# Patient Record
Sex: FEMALE | Race: WHITE | NOT HISPANIC OR LATINO | Employment: OTHER | URBAN - METROPOLITAN AREA
[De-identification: names, ages, dates, MRNs, and addresses within clinical notes are randomized per-mention and may not be internally consistent; named-entity substitution may affect disease eponyms.]

---

## 2023-03-15 PROBLEM — S46.912A STRAIN OF LEFT SHOULDER: Status: ACTIVE | Noted: 2023-03-15

## 2023-03-15 PROBLEM — Z20.822 SUSPECTED COVID-19 VIRUS INFECTION: Status: ACTIVE | Noted: 2023-03-15

## 2023-03-15 PROBLEM — M54.32 SCIATICA OF LEFT SIDE: Status: ACTIVE | Noted: 2023-03-15

## 2023-03-15 PROBLEM — Z96.641 STATUS POST RIGHT HIP REPLACEMENT: Status: ACTIVE | Noted: 2023-03-15

## 2023-03-15 PROBLEM — Z79.2 NEED FOR PROPHYLACTIC ANTIBIOTIC: Status: ACTIVE | Noted: 2023-03-15

## 2023-03-15 PROBLEM — M25.562 LEFT KNEE PAIN: Status: ACTIVE | Noted: 2023-03-15

## 2023-03-15 PROBLEM — M16.11 PRIMARY OSTEOARTHRITIS OF RIGHT HIP: Status: ACTIVE | Noted: 2023-03-15

## 2023-03-15 PROBLEM — S60.219A WRIST CONTUSION: Status: ACTIVE | Noted: 2023-03-15

## 2023-03-15 PROBLEM — J30.9 ALLERGIC RHINITIS: Status: ACTIVE | Noted: 2023-03-15

## 2023-03-15 PROBLEM — S83.249A MEDIAL MENISCUS TEAR: Status: ACTIVE | Noted: 2023-03-15

## 2023-03-15 PROBLEM — H90.3 BILATERAL SENSORINEURAL HEARING LOSS: Status: ACTIVE | Noted: 2023-03-15

## 2023-03-15 PROBLEM — M17.12 PRIMARY OSTEOARTHRITIS OF LEFT KNEE: Status: ACTIVE | Noted: 2023-03-15

## 2023-03-15 PROBLEM — R19.7 DIARRHEA: Status: ACTIVE | Noted: 2023-03-15

## 2023-03-15 PROBLEM — A09 DIARRHEA OF INFECTIOUS ORIGIN: Status: ACTIVE | Noted: 2023-03-15

## 2023-03-15 PROBLEM — R43.2 DYSGEUSIA: Status: ACTIVE | Noted: 2023-03-15

## 2023-03-15 PROBLEM — R10.9 ABDOMINAL PAIN: Status: ACTIVE | Noted: 2023-03-15

## 2023-03-15 PROBLEM — M79.645 BILATERAL THUMB PAIN: Status: ACTIVE | Noted: 2023-03-15

## 2023-03-15 PROBLEM — R00.2 PALPITATIONS: Status: ACTIVE | Noted: 2023-03-15

## 2023-03-15 PROBLEM — N39.3 STRESS INCONTINENCE OF URINE: Status: ACTIVE | Noted: 2023-03-15

## 2023-03-15 PROBLEM — R05.9 COUGH: Status: ACTIVE | Noted: 2023-03-15

## 2023-03-15 PROBLEM — M18.9 OSTEOARTHRITIS OF CARPOMETACARPAL (CMC) JOINT OF THUMB: Status: ACTIVE | Noted: 2023-03-15

## 2023-03-15 PROBLEM — I25.10 ARTERIOSCLEROSIS OF CORONARY ARTERY: Status: ACTIVE | Noted: 2023-03-15

## 2023-03-15 PROBLEM — S86.919A STRAIN OF KNEE: Status: ACTIVE | Noted: 2023-03-15

## 2023-03-15 PROBLEM — S80.00XA CONTUSION, KNEE: Status: ACTIVE | Noted: 2023-03-15

## 2023-03-15 PROBLEM — E03.9 ACQUIRED HYPOTHYROIDISM: Status: ACTIVE | Noted: 2023-03-15

## 2023-03-15 PROBLEM — R43.0 ANOSMIA: Status: ACTIVE | Noted: 2023-03-15

## 2023-03-15 PROBLEM — K52.832 LYMPHOCYTIC COLITIS: Status: ACTIVE | Noted: 2023-03-15

## 2023-03-15 PROBLEM — M19.012 GLENOHUMERAL ARTHRITIS, LEFT: Status: ACTIVE | Noted: 2023-03-15

## 2023-03-15 PROBLEM — E55.9 VITAMIN D DEFICIENCY: Status: ACTIVE | Noted: 2023-03-15

## 2023-03-15 PROBLEM — M79.644 BILATERAL THUMB PAIN: Status: ACTIVE | Noted: 2023-03-15

## 2023-03-15 PROBLEM — H91.90 HEARING LOSS: Status: ACTIVE | Noted: 2023-03-15

## 2023-03-15 PROBLEM — R82.998 LEUKOCYTES IN URINE: Status: ACTIVE | Noted: 2023-03-15

## 2023-03-15 PROBLEM — H91.93 BILATERAL HEARING LOSS: Status: ACTIVE | Noted: 2023-03-15

## 2023-03-15 PROBLEM — I10 ESSENTIAL HYPERTENSION: Status: ACTIVE | Noted: 2023-03-15

## 2023-03-15 PROBLEM — G47.00 INSOMNIA: Status: ACTIVE | Noted: 2023-03-15

## 2023-03-15 PROBLEM — E78.5 HYPERLIPIDEMIA: Status: ACTIVE | Noted: 2023-03-15

## 2023-03-15 PROBLEM — M25.551 RIGHT HIP PAIN: Status: ACTIVE | Noted: 2023-03-15

## 2023-03-15 PROBLEM — K62.89 IDIOPATHIC PROCTITIS: Status: ACTIVE | Noted: 2023-03-15

## 2023-03-15 RX ORDER — FLUTICASONE PROPIONATE 50 MCG
2 SPRAY, SUSPENSION (ML) NASAL DAILY
COMMUNITY
Start: 2017-01-20

## 2023-03-15 RX ORDER — IBUPROFEN 100 MG/5ML
1 SUSPENSION, ORAL (FINAL DOSE FORM) ORAL DAILY
COMMUNITY

## 2023-03-15 RX ORDER — BUTYROSPERMUM PARKII(SHEA BUTTER), SIMMONDSIA CHINENSIS (JOJOBA) SEED OIL, ALOE BARBADENSIS LEAF EXTRACT .01; 1; 3.5 G/100G; G/100G; G/100G
1 LIQUID TOPICAL 2 TIMES DAILY
COMMUNITY
Start: 2021-11-22

## 2023-03-15 RX ORDER — CHOLECALCIFEROL (VITAMIN D3) 25 MCG
1 TABLET ORAL DAILY
COMMUNITY

## 2023-03-15 RX ORDER — LEVOTHYROXINE SODIUM 88 UG/1
1 TABLET ORAL DAILY
COMMUNITY
Start: 2013-07-26 | End: 2023-09-06

## 2023-03-15 RX ORDER — CEFUROXIME AXETIL 250 MG/1
250 TABLET ORAL DAILY
COMMUNITY
End: 2023-06-19 | Stop reason: ALTCHOICE

## 2023-03-15 RX ORDER — ATORVASTATIN CALCIUM 40 MG/1
1 TABLET, FILM COATED ORAL DAILY
COMMUNITY
Start: 2014-02-10 | End: 2023-06-19 | Stop reason: SDUPTHER

## 2023-03-15 RX ORDER — EPINEPHRINE 0.3 MG/.3ML
INJECTION SUBCUTANEOUS
COMMUNITY
Start: 2019-08-17 | End: 2023-06-19 | Stop reason: SDUPTHER

## 2023-03-15 RX ORDER — METOPROLOL SUCCINATE 25 MG/1
1 TABLET, EXTENDED RELEASE ORAL DAILY
COMMUNITY
End: 2023-06-19 | Stop reason: ALTCHOICE

## 2023-03-15 RX ORDER — MIRABEGRON 25 MG/1
1 TABLET, FILM COATED, EXTENDED RELEASE ORAL DAILY
COMMUNITY
Start: 2022-05-04 | End: 2023-06-19

## 2023-03-31 ENCOUNTER — OFFICE VISIT (OUTPATIENT)
Dept: PRIMARY CARE | Facility: CLINIC | Age: 73
End: 2023-03-31
Payer: MEDICARE

## 2023-03-31 VITALS
HEIGHT: 66 IN | DIASTOLIC BLOOD PRESSURE: 66 MMHG | RESPIRATION RATE: 16 BRPM | BODY MASS INDEX: 26.71 KG/M2 | HEART RATE: 67 BPM | TEMPERATURE: 98.4 F | OXYGEN SATURATION: 98 % | SYSTOLIC BLOOD PRESSURE: 128 MMHG | WEIGHT: 166.2 LBS

## 2023-03-31 DIAGNOSIS — M25.572 ACUTE LEFT ANKLE PAIN: ICD-10-CM

## 2023-03-31 DIAGNOSIS — M79.672 FOOT PAIN, LEFT: Primary | ICD-10-CM

## 2023-03-31 PROCEDURE — 1036F TOBACCO NON-USER: CPT | Performed by: FAMILY MEDICINE

## 2023-03-31 PROCEDURE — 1159F MED LIST DOCD IN RCRD: CPT | Performed by: FAMILY MEDICINE

## 2023-03-31 PROCEDURE — 3078F DIAST BP <80 MM HG: CPT | Performed by: FAMILY MEDICINE

## 2023-03-31 PROCEDURE — 1160F RVW MEDS BY RX/DR IN RCRD: CPT | Performed by: FAMILY MEDICINE

## 2023-03-31 PROCEDURE — 3074F SYST BP LT 130 MM HG: CPT | Performed by: FAMILY MEDICINE

## 2023-03-31 PROCEDURE — 99213 OFFICE O/P EST LOW 20 MIN: CPT | Performed by: FAMILY MEDICINE

## 2023-03-31 ASSESSMENT — PATIENT HEALTH QUESTIONNAIRE - PHQ9
2. FEELING DOWN, DEPRESSED OR HOPELESS: NOT AT ALL
SUM OF ALL RESPONSES TO PHQ9 QUESTIONS 1 AND 2: 0
1. LITTLE INTEREST OR PLEASURE IN DOING THINGS: NOT AT ALL

## 2023-03-31 ASSESSMENT — PAIN SCALES - GENERAL: PAINLEVEL: 9

## 2023-03-31 NOTE — PROGRESS NOTES
"Subjective   Patient ID: Velma Lira is a 72 y.o. female who presents for Ankle Pain (LT x ).    Here with complaints of left ankle pain for the past few months.    Swelling and pain   Not red or warm  No injury noted  No sleeve  Taking 2 Tylenol arthritis in am  CBD pills in am    Worse first in am  Once up and moving slight better  Throbbing after walking  No hx of gout      Ankle Pain          Review of Systems    Objective   /66   Pulse 67   Temp 36.9 °C (98.4 °F)   Resp 16   Ht 1.676 m (5' 6\")   Wt 75.4 kg (166 lb 3.2 oz)   SpO2 98%   BMI 26.83 kg/m²     Physical Exam  Musculoskeletal:      Left ankle: Swelling present. Decreased range of motion.      Left foot: Swelling present.      Comments: Palp pain medial ankle joint and medial arch, not red warm or swollen in arch         Assessment/Plan   Problem List Items Addressed This Visit          Musculoskeletal    Foot pain, left - Primary    Relevant Orders    XR foot left 3+ views    Acute left ankle pain    Relevant Orders    XR ankle left 3+ views     Acute left foot and ankle swelling and pain  Checking x-ray  Continue Tylenol  Gentle ROM exercises  Consider checking for gout if not improving.   Return if not rapidly improving.      "

## 2023-04-03 DIAGNOSIS — M25.572 ACUTE LEFT ANKLE PAIN: Primary | ICD-10-CM

## 2023-05-12 DIAGNOSIS — I10 ESSENTIAL HYPERTENSION: Primary | ICD-10-CM

## 2023-05-12 RX ORDER — METOPROLOL SUCCINATE 50 MG/1
TABLET, EXTENDED RELEASE ORAL
Qty: 90 TABLET | Refills: 0 | Status: SHIPPED | OUTPATIENT
Start: 2023-05-12 | End: 2023-08-03 | Stop reason: SDUPTHER

## 2023-05-12 NOTE — TELEPHONE ENCOUNTER
Patient called, needs refill of Metoprolol ER called into [facility] on [address].  Last OV 3/31/2023, next OV 6/13/2023.

## 2023-06-13 ENCOUNTER — APPOINTMENT (OUTPATIENT)
Dept: PRIMARY CARE | Facility: CLINIC | Age: 73
End: 2023-06-13
Payer: MEDICARE

## 2023-06-19 ENCOUNTER — OFFICE VISIT (OUTPATIENT)
Dept: PRIMARY CARE | Facility: CLINIC | Age: 73
End: 2023-06-19
Payer: MEDICARE

## 2023-06-19 VITALS
SYSTOLIC BLOOD PRESSURE: 128 MMHG | RESPIRATION RATE: 16 BRPM | OXYGEN SATURATION: 96 % | WEIGHT: 165.2 LBS | BODY MASS INDEX: 26.55 KG/M2 | TEMPERATURE: 98.6 F | DIASTOLIC BLOOD PRESSURE: 74 MMHG | HEIGHT: 66 IN | HEART RATE: 66 BPM

## 2023-06-19 DIAGNOSIS — I10 ESSENTIAL HYPERTENSION: ICD-10-CM

## 2023-06-19 DIAGNOSIS — Z91.030 BEE STING ALLERGY: ICD-10-CM

## 2023-06-19 DIAGNOSIS — Z00.00 MEDICARE ANNUAL WELLNESS VISIT, SUBSEQUENT: Primary | ICD-10-CM

## 2023-06-19 DIAGNOSIS — N39.3 STRESS INCONTINENCE OF URINE: ICD-10-CM

## 2023-06-19 DIAGNOSIS — I25.10 ARTERIOSCLEROSIS OF CORONARY ARTERY: ICD-10-CM

## 2023-06-19 DIAGNOSIS — E78.2 MIXED HYPERLIPIDEMIA: ICD-10-CM

## 2023-06-19 DIAGNOSIS — E03.9 ACQUIRED HYPOTHYROIDISM: ICD-10-CM

## 2023-06-19 PROBLEM — R10.9 ABDOMINAL PAIN: Status: RESOLVED | Noted: 2023-03-15 | Resolved: 2023-06-19

## 2023-06-19 PROBLEM — Z79.2 NEED FOR PROPHYLACTIC ANTIBIOTIC: Status: RESOLVED | Noted: 2023-03-15 | Resolved: 2023-06-19

## 2023-06-19 PROBLEM — Z20.822 SUSPECTED COVID-19 VIRUS INFECTION: Status: RESOLVED | Noted: 2023-03-15 | Resolved: 2023-06-19

## 2023-06-19 PROBLEM — Z96.641 STATUS POST RIGHT HIP REPLACEMENT: Status: RESOLVED | Noted: 2023-03-15 | Resolved: 2023-06-19

## 2023-06-19 PROBLEM — M17.12 PRIMARY OSTEOARTHRITIS OF LEFT KNEE: Status: RESOLVED | Noted: 2023-03-15 | Resolved: 2023-06-19

## 2023-06-19 PROBLEM — S83.249A MEDIAL MENISCUS TEAR: Status: RESOLVED | Noted: 2023-03-15 | Resolved: 2023-06-19

## 2023-06-19 PROBLEM — M79.644 BILATERAL THUMB PAIN: Status: RESOLVED | Noted: 2023-03-15 | Resolved: 2023-06-19

## 2023-06-19 PROBLEM — M54.32 SCIATICA OF LEFT SIDE: Status: RESOLVED | Noted: 2023-03-15 | Resolved: 2023-06-19

## 2023-06-19 PROBLEM — R00.2 PALPITATIONS: Status: RESOLVED | Noted: 2023-03-15 | Resolved: 2023-06-19

## 2023-06-19 PROBLEM — M18.9 OSTEOARTHRITIS OF CARPOMETACARPAL (CMC) JOINT OF THUMB: Status: RESOLVED | Noted: 2023-03-15 | Resolved: 2023-06-19

## 2023-06-19 PROBLEM — M79.672 FOOT PAIN, LEFT: Status: RESOLVED | Noted: 2023-03-31 | Resolved: 2023-06-19

## 2023-06-19 PROBLEM — R19.7 DIARRHEA: Status: RESOLVED | Noted: 2023-03-15 | Resolved: 2023-06-19

## 2023-06-19 PROBLEM — H91.93 BILATERAL HEARING LOSS: Status: RESOLVED | Noted: 2023-03-15 | Resolved: 2023-06-19

## 2023-06-19 PROBLEM — M25.551 RIGHT HIP PAIN: Status: RESOLVED | Noted: 2023-03-15 | Resolved: 2023-06-19

## 2023-06-19 PROBLEM — M25.562 LEFT KNEE PAIN: Status: RESOLVED | Noted: 2023-03-15 | Resolved: 2023-06-19

## 2023-06-19 PROBLEM — J30.9 ALLERGIC RHINITIS: Status: RESOLVED | Noted: 2023-03-15 | Resolved: 2023-06-19

## 2023-06-19 PROBLEM — S46.912A STRAIN OF LEFT SHOULDER: Status: RESOLVED | Noted: 2023-03-15 | Resolved: 2023-06-19

## 2023-06-19 PROBLEM — R43.2 DYSGEUSIA: Status: RESOLVED | Noted: 2023-03-15 | Resolved: 2023-06-19

## 2023-06-19 PROBLEM — E55.9 VITAMIN D DEFICIENCY: Status: RESOLVED | Noted: 2023-03-15 | Resolved: 2023-06-19

## 2023-06-19 PROBLEM — A09 DIARRHEA OF INFECTIOUS ORIGIN: Status: RESOLVED | Noted: 2023-03-15 | Resolved: 2023-06-19

## 2023-06-19 PROBLEM — S60.219A WRIST CONTUSION: Status: RESOLVED | Noted: 2023-03-15 | Resolved: 2023-06-19

## 2023-06-19 PROBLEM — M16.11 PRIMARY OSTEOARTHRITIS OF RIGHT HIP: Status: RESOLVED | Noted: 2023-03-15 | Resolved: 2023-06-19

## 2023-06-19 PROBLEM — R43.0 ANOSMIA: Status: RESOLVED | Noted: 2023-03-15 | Resolved: 2023-06-19

## 2023-06-19 PROBLEM — S86.919A STRAIN OF KNEE: Status: RESOLVED | Noted: 2023-03-15 | Resolved: 2023-06-19

## 2023-06-19 PROBLEM — M25.572 ACUTE LEFT ANKLE PAIN: Status: RESOLVED | Noted: 2023-03-31 | Resolved: 2023-06-19

## 2023-06-19 PROBLEM — M79.645 BILATERAL THUMB PAIN: Status: RESOLVED | Noted: 2023-03-15 | Resolved: 2023-06-19

## 2023-06-19 PROBLEM — R82.998 LEUKOCYTES IN URINE: Status: RESOLVED | Noted: 2023-03-15 | Resolved: 2023-06-19

## 2023-06-19 PROBLEM — R05.9 COUGH: Status: RESOLVED | Noted: 2023-03-15 | Resolved: 2023-06-19

## 2023-06-19 PROBLEM — S80.00XA CONTUSION, KNEE: Status: RESOLVED | Noted: 2023-03-15 | Resolved: 2023-06-19

## 2023-06-19 PROBLEM — K62.89 IDIOPATHIC PROCTITIS: Status: RESOLVED | Noted: 2023-03-15 | Resolved: 2023-06-19

## 2023-06-19 PROCEDURE — 1036F TOBACCO NON-USER: CPT | Performed by: FAMILY MEDICINE

## 2023-06-19 PROCEDURE — 1160F RVW MEDS BY RX/DR IN RCRD: CPT | Performed by: FAMILY MEDICINE

## 2023-06-19 PROCEDURE — 99214 OFFICE O/P EST MOD 30 MIN: CPT | Performed by: FAMILY MEDICINE

## 2023-06-19 PROCEDURE — 1170F FXNL STATUS ASSESSED: CPT | Performed by: FAMILY MEDICINE

## 2023-06-19 PROCEDURE — G0439 PPPS, SUBSEQ VISIT: HCPCS | Performed by: FAMILY MEDICINE

## 2023-06-19 PROCEDURE — 1159F MED LIST DOCD IN RCRD: CPT | Performed by: FAMILY MEDICINE

## 2023-06-19 PROCEDURE — 3074F SYST BP LT 130 MM HG: CPT | Performed by: FAMILY MEDICINE

## 2023-06-19 PROCEDURE — 3078F DIAST BP <80 MM HG: CPT | Performed by: FAMILY MEDICINE

## 2023-06-19 RX ORDER — ATORVASTATIN CALCIUM 40 MG/1
40 TABLET, FILM COATED ORAL DAILY
Qty: 90 TABLET | Refills: 1 | Status: SHIPPED | OUTPATIENT
Start: 2023-06-19 | End: 2023-07-17 | Stop reason: SDUPTHER

## 2023-06-19 RX ORDER — EPINEPHRINE 0.3 MG/.3ML
1 INJECTION SUBCUTANEOUS AS NEEDED
Qty: 2 ML | Refills: 3 | Status: SHIPPED | OUTPATIENT
Start: 2023-06-19

## 2023-06-19 ASSESSMENT — ACTIVITIES OF DAILY LIVING (ADL)
DOING_HOUSEWORK: INDEPENDENT
DRESSING: INDEPENDENT
TAKING_MEDICATION: INDEPENDENT
GROCERY_SHOPPING: INDEPENDENT
BATHING: INDEPENDENT
MANAGING_FINANCES: INDEPENDENT

## 2023-06-19 ASSESSMENT — PATIENT HEALTH QUESTIONNAIRE - PHQ9
SUM OF ALL RESPONSES TO PHQ9 QUESTIONS 1 AND 2: 0
2. FEELING DOWN, DEPRESSED OR HOPELESS: NOT AT ALL
1. LITTLE INTEREST OR PLEASURE IN DOING THINGS: NOT AT ALL

## 2023-06-19 NOTE — ASSESSMENT & PLAN NOTE
Stable  Refilling meds at same dose.  Checking basic labs.  Continue regular physical activity.  Recheck in 6 months

## 2023-06-19 NOTE — PROGRESS NOTES
"Subjective   Reason for Visit: Velma Lira is an 72 y.o. female here for a Medicare Wellness visit.     Past Medical, Surgical, and Family History reviewed and updated in chart.    Reviewed all medications by prescribing practitioner or clinical pharmacist (such as prescriptions, OTCs, herbal therapies and supplements) and documented in the medical record.    Here for general check and wellness exam.    Taking medications daily for hypertension hyperlipidemia hypothyroidism and allergies.  Stable on all meds.  No home BP checks  Playing golf regular.   No longer needing to see cardiology  Denies chest pain, shortness of breath, lightheaded, dizziness or headaches.   Sleeping ok  Diet is good  Taking tylenol arthritis and stable    Taking vitamin D and calcium daily.  Mammogram 1/30/2023  Bone density 2/20.  Colonoscopy 2/21 recheck in 10  Due for Prevnar 20        Patient Care Team:  Jessa Kaufman Pla, DO as PCP - General  Jessa Kaufman Pla, DO as PCP - [facility] Attributed Provider     Review of Systems    Objective   Vitals:  /74   Pulse 66   Temp 37 °C (98.6 °F)   Resp 16   Ht 1.676 m (5' 6\")   Wt 74.9 kg (165 lb 3.2 oz)   SpO2 96%   BMI 26.66 kg/m²       Physical Exam  Vitals and nursing note reviewed.   Constitutional:       Appearance: Normal appearance.   Cardiovascular:      Rate and Rhythm: Normal rate and regular rhythm.   Pulmonary:      Effort: Pulmonary effort is normal.      Breath sounds: Normal breath sounds.   Musculoskeletal:      Cervical back: Normal range of motion.   Neurological:      Mental Status: She is alert.   Psychiatric:         Mood and Affect: Mood normal.         Behavior: Behavior normal.         Thought Content: Thought content normal.         Judgment: Judgment normal.         Assessment/Plan   Problem List Items Addressed This Visit       Acquired hypothyroidism    Current Assessment & Plan     Stable  Reviewed previous TSH results.  Recheck in 6 months      "    Arteriosclerosis of coronary artery    Current Assessment & Plan     Stable  Continue diet changes.  Continue statin.  Recheck in 6 months         Relevant Medications    EPINEPHrine 0.3 mg/0.3 mL injection syringe    Essential hypertension    Current Assessment & Plan     Stable  Refilling meds at same dose.  Checking basic labs.  Continue regular physical activity.  Recheck in 6 months         Relevant Orders    CBC and Auto Differential    Hyperlipidemia    Current Assessment & Plan     Reviewed diet.  Checking fasting lipid profile and adjust meds accordingly         Relevant Medications    atorvastatin (Lipitor) 40 mg tablet    Other Relevant Orders    Comprehensive Metabolic Panel    Lipid Panel    Stress incontinence of urine    Current Assessment & Plan     Stable without meds  Using pads.  Continue to monitor         Medicare annual wellness visit, subsequent - Primary    Current Assessment & Plan     Exam complete.  Health screenings up-to-date.  Declined Prevnar 20 vaccine.  AD reviewed.              Other Visit Diagnoses       Bee sting allergy        Relevant Medications    EPINEPHrine 0.3 mg/0.3 mL injection syringe

## 2023-07-03 ENCOUNTER — TELEPHONE (OUTPATIENT)
Dept: PRIMARY CARE | Facility: CLINIC | Age: 73
End: 2023-07-03
Payer: MEDICARE

## 2023-07-12 ENCOUNTER — LAB (OUTPATIENT)
Dept: LAB | Facility: LAB | Age: 73
End: 2023-07-12
Payer: MEDICARE

## 2023-07-12 DIAGNOSIS — I10 ESSENTIAL HYPERTENSION: ICD-10-CM

## 2023-07-12 DIAGNOSIS — E78.2 MIXED HYPERLIPIDEMIA: ICD-10-CM

## 2023-07-12 LAB
ALANINE AMINOTRANSFERASE (SGPT) (U/L) IN SER/PLAS: 15 U/L (ref 7–45)
ALBUMIN (G/DL) IN SER/PLAS: 4.1 G/DL (ref 3.4–5)
ALKALINE PHOSPHATASE (U/L) IN SER/PLAS: 56 U/L (ref 33–136)
ANION GAP IN SER/PLAS: 11 MMOL/L (ref 10–20)
ASPARTATE AMINOTRANSFERASE (SGOT) (U/L) IN SER/PLAS: 19 U/L (ref 9–39)
BASOPHILS (10*3/UL) IN BLOOD BY AUTOMATED COUNT: 0.04 X10E9/L (ref 0–0.1)
BASOPHILS/100 LEUKOCYTES IN BLOOD BY AUTOMATED COUNT: 0.7 % (ref 0–2)
BILIRUBIN TOTAL (MG/DL) IN SER/PLAS: 0.8 MG/DL (ref 0–1.2)
CALCIUM (MG/DL) IN SER/PLAS: 9.6 MG/DL (ref 8.6–10.6)
CARBON DIOXIDE, TOTAL (MMOL/L) IN SER/PLAS: 30 MMOL/L (ref 21–32)
CHLORIDE (MMOL/L) IN SER/PLAS: 108 MMOL/L (ref 98–107)
CHOLESTEROL (MG/DL) IN SER/PLAS: 165 MG/DL (ref 0–199)
CHOLESTEROL IN HDL (MG/DL) IN SER/PLAS: 65.4 MG/DL
CHOLESTEROL/HDL RATIO: 2.5
CREATININE (MG/DL) IN SER/PLAS: 0.69 MG/DL (ref 0.5–1.05)
EOSINOPHILS (10*3/UL) IN BLOOD BY AUTOMATED COUNT: 0.29 X10E9/L (ref 0–0.4)
EOSINOPHILS/100 LEUKOCYTES IN BLOOD BY AUTOMATED COUNT: 4.8 % (ref 0–6)
ERYTHROCYTE DISTRIBUTION WIDTH (RATIO) BY AUTOMATED COUNT: 12.7 % (ref 11.5–14.5)
ERYTHROCYTE MEAN CORPUSCULAR HEMOGLOBIN CONCENTRATION (G/DL) BY AUTOMATED: 33 G/DL (ref 32–36)
ERYTHROCYTE MEAN CORPUSCULAR VOLUME (FL) BY AUTOMATED COUNT: 93 FL (ref 80–100)
ERYTHROCYTES (10*6/UL) IN BLOOD BY AUTOMATED COUNT: 4.14 X10E12/L (ref 4–5.2)
GFR FEMALE: >90 ML/MIN/1.73M2
GLUCOSE (MG/DL) IN SER/PLAS: 102 MG/DL (ref 74–99)
HEMATOCRIT (%) IN BLOOD BY AUTOMATED COUNT: 38.5 % (ref 36–46)
HEMOGLOBIN (G/DL) IN BLOOD: 12.7 G/DL (ref 12–16)
IMMATURE GRANULOCYTES/100 LEUKOCYTES IN BLOOD BY AUTOMATED COUNT: 0.2 % (ref 0–0.9)
LDL: 72 MG/DL (ref 0–99)
LEUKOCYTES (10*3/UL) IN BLOOD BY AUTOMATED COUNT: 6 X10E9/L (ref 4.4–11.3)
LYMPHOCYTES (10*3/UL) IN BLOOD BY AUTOMATED COUNT: 2.32 X10E9/L (ref 0.8–3)
LYMPHOCYTES/100 LEUKOCYTES IN BLOOD BY AUTOMATED COUNT: 38.7 % (ref 13–44)
MONOCYTES (10*3/UL) IN BLOOD BY AUTOMATED COUNT: 0.53 X10E9/L (ref 0.05–0.8)
MONOCYTES/100 LEUKOCYTES IN BLOOD BY AUTOMATED COUNT: 8.8 % (ref 2–10)
NEUTROPHILS (10*3/UL) IN BLOOD BY AUTOMATED COUNT: 2.81 X10E9/L (ref 1.6–5.5)
NEUTROPHILS/100 LEUKOCYTES IN BLOOD BY AUTOMATED COUNT: 46.8 % (ref 40–80)
NRBC (PER 100 WBCS) BY AUTOMATED COUNT: 0 /100 WBC (ref 0–0)
PLATELETS (10*3/UL) IN BLOOD AUTOMATED COUNT: 177 X10E9/L (ref 150–450)
POTASSIUM (MMOL/L) IN SER/PLAS: 4.3 MMOL/L (ref 3.5–5.3)
PROTEIN TOTAL: 6.2 G/DL (ref 6.4–8.2)
SODIUM (MMOL/L) IN SER/PLAS: 145 MMOL/L (ref 136–145)
TRIGLYCERIDE (MG/DL) IN SER/PLAS: 138 MG/DL (ref 0–149)
UREA NITROGEN (MG/DL) IN SER/PLAS: 17 MG/DL (ref 6–23)
VLDL: 28 MG/DL (ref 0–40)

## 2023-07-12 PROCEDURE — 85025 COMPLETE CBC W/AUTO DIFF WBC: CPT

## 2023-07-12 PROCEDURE — 80061 LIPID PANEL: CPT

## 2023-07-12 PROCEDURE — 80053 COMPREHEN METABOLIC PANEL: CPT

## 2023-07-12 PROCEDURE — 36415 COLL VENOUS BLD VENIPUNCTURE: CPT

## 2023-07-17 DIAGNOSIS — E78.2 MIXED HYPERLIPIDEMIA: ICD-10-CM

## 2023-07-17 RX ORDER — ATORVASTATIN CALCIUM 40 MG/1
40 TABLET, FILM COATED ORAL DAILY
Qty: 90 TABLET | Refills: 0 | Status: SHIPPED | OUTPATIENT
Start: 2023-07-17 | End: 2023-10-02 | Stop reason: SDUPTHER

## 2023-08-02 DIAGNOSIS — I10 ESSENTIAL HYPERTENSION: ICD-10-CM

## 2023-08-03 ENCOUNTER — TELEPHONE (OUTPATIENT)
Dept: PRIMARY CARE | Facility: CLINIC | Age: 73
End: 2023-08-03
Payer: MEDICARE

## 2023-08-03 DIAGNOSIS — I10 ESSENTIAL HYPERTENSION: ICD-10-CM

## 2023-08-03 RX ORDER — METOPROLOL SUCCINATE 50 MG/1
TABLET, EXTENDED RELEASE ORAL
Qty: 90 TABLET | Refills: 0 | OUTPATIENT
Start: 2023-08-03

## 2023-08-03 RX ORDER — METOPROLOL SUCCINATE 50 MG/1
50 TABLET, EXTENDED RELEASE ORAL DAILY
Qty: 90 TABLET | Refills: 0 | Status: SHIPPED | OUTPATIENT
Start: 2023-08-03 | End: 2023-10-31 | Stop reason: SDUPTHER

## 2023-09-06 DIAGNOSIS — E03.9 ACQUIRED HYPOTHYROIDISM: Primary | ICD-10-CM

## 2023-09-06 RX ORDER — LEVOTHYROXINE SODIUM 88 UG/1
88 TABLET ORAL DAILY
Qty: 90 TABLET | Refills: 0 | Status: SHIPPED | OUTPATIENT
Start: 2023-09-06 | End: 2024-01-01

## 2023-10-02 DIAGNOSIS — E78.2 MIXED HYPERLIPIDEMIA: ICD-10-CM

## 2023-10-02 RX ORDER — ATORVASTATIN CALCIUM 40 MG/1
40 TABLET, FILM COATED ORAL DAILY
Qty: 90 TABLET | Refills: 0 | Status: SHIPPED | OUTPATIENT
Start: 2023-10-02 | End: 2024-04-11

## 2023-10-31 DIAGNOSIS — I10 ESSENTIAL HYPERTENSION: ICD-10-CM

## 2023-10-31 RX ORDER — METOPROLOL SUCCINATE 50 MG/1
50 TABLET, EXTENDED RELEASE ORAL DAILY
Qty: 90 TABLET | Refills: 0 | Status: SHIPPED | OUTPATIENT
Start: 2023-10-31 | End: 2024-01-16 | Stop reason: SDUPTHER

## 2023-10-31 NOTE — TELEPHONE ENCOUNTER
Last OV 06/23   Requested Prescriptions     Pending Prescriptions Disp Refills    metoprolol succinate XL (Toprol-XL) 50 mg 24 hr tablet 90 tablet 0     Sig: Take 1 tablet (50 mg) by mouth once daily. Do not crush or chew.

## 2023-12-26 DIAGNOSIS — E03.9 ACQUIRED HYPOTHYROIDISM: ICD-10-CM

## 2023-12-27 NOTE — TELEPHONE ENCOUNTER
Pt called for refills of levothyroxine 88 mcg sent to [facility] on [address]  Next OV 1/6/2024.

## 2024-01-01 DIAGNOSIS — I10 ESSENTIAL HYPERTENSION: ICD-10-CM

## 2024-01-01 DIAGNOSIS — E03.9 ACQUIRED HYPOTHYROIDISM: Primary | ICD-10-CM

## 2024-01-01 RX ORDER — LEVOTHYROXINE SODIUM 88 UG/1
88 TABLET ORAL DAILY
Qty: 90 TABLET | Refills: 0 | Status: SHIPPED | OUTPATIENT
Start: 2024-01-01 | End: 2024-04-14

## 2024-01-16 ENCOUNTER — OFFICE VISIT (OUTPATIENT)
Dept: PRIMARY CARE | Facility: CLINIC | Age: 74
End: 2024-01-16
Payer: MEDICARE

## 2024-01-16 VITALS
WEIGHT: 169.6 LBS | OXYGEN SATURATION: 97 % | HEIGHT: 66 IN | RESPIRATION RATE: 16 BRPM | TEMPERATURE: 97 F | BODY MASS INDEX: 27.26 KG/M2 | SYSTOLIC BLOOD PRESSURE: 130 MMHG | HEART RATE: 72 BPM | DIASTOLIC BLOOD PRESSURE: 74 MMHG

## 2024-01-16 DIAGNOSIS — I10 ESSENTIAL HYPERTENSION: ICD-10-CM

## 2024-01-16 DIAGNOSIS — E78.2 MIXED HYPERLIPIDEMIA: ICD-10-CM

## 2024-01-16 DIAGNOSIS — E03.9 ACQUIRED HYPOTHYROIDISM: Primary | ICD-10-CM

## 2024-01-16 PROCEDURE — 1159F MED LIST DOCD IN RCRD: CPT | Performed by: FAMILY MEDICINE

## 2024-01-16 PROCEDURE — 99214 OFFICE O/P EST MOD 30 MIN: CPT | Performed by: FAMILY MEDICINE

## 2024-01-16 PROCEDURE — 1036F TOBACCO NON-USER: CPT | Performed by: FAMILY MEDICINE

## 2024-01-16 PROCEDURE — 1125F AMNT PAIN NOTED PAIN PRSNT: CPT | Performed by: FAMILY MEDICINE

## 2024-01-16 PROCEDURE — 3075F SYST BP GE 130 - 139MM HG: CPT | Performed by: FAMILY MEDICINE

## 2024-01-16 PROCEDURE — 1160F RVW MEDS BY RX/DR IN RCRD: CPT | Performed by: FAMILY MEDICINE

## 2024-01-16 PROCEDURE — 3078F DIAST BP <80 MM HG: CPT | Performed by: FAMILY MEDICINE

## 2024-01-16 RX ORDER — METOPROLOL SUCCINATE 50 MG/1
50 TABLET, EXTENDED RELEASE ORAL DAILY
Qty: 90 TABLET | Refills: 0 | Status: SHIPPED | OUTPATIENT
Start: 2024-01-16 | End: 2024-05-01 | Stop reason: SDUPTHER

## 2024-01-16 NOTE — PROGRESS NOTES
"Subjective   Patient ID: Velma Lira is a 73 y.o. female who presents for Follow-up (6 mth med check).    Here for med check and refill.    Taking medications daily for hypertension hyperlipidemia.  In general doing well.    No no home BP checks  Denies chest pain, shortness of breath, lightheaded, dizziness or headaches.   Caring for her  can be a challenge   He does not want to move much.    Exercise bike and elliptical daily for 60 min   Watching her diet.  Weight is stable.   Sleeping poorly off and on   Some difficulty falling asleep  Taking CBD/THC gummies off and on  Melatonin does not help.     Will be seeing gyn this year for mammogram         Review of Systems    Objective   /74   Pulse 72   Temp 36.1 °C (97 °F)   Resp 16   Ht 1.676 m (5' 6\")   Wt 76.9 kg (169 lb 9.6 oz)   SpO2 97%   BMI 27.37 kg/m²     Physical Exam  Vitals and nursing note reviewed.   Constitutional:       Appearance: Normal appearance.   Cardiovascular:      Rate and Rhythm: Normal rate and regular rhythm.   Pulmonary:      Effort: Pulmonary effort is normal.      Breath sounds: Normal breath sounds.   Musculoskeletal:      Cervical back: Normal range of motion.   Neurological:      Mental Status: She is alert.   Psychiatric:         Mood and Affect: Mood normal.         Behavior: Behavior normal.         Thought Content: Thought content normal.         Judgment: Judgment normal.       Assessment/Plan   Problem List Items Addressed This Visit             ICD-10-CM    Acquired hypothyroidism - Primary E03.9     Stable  Checking TSH level and adjust meds accordingly         Relevant Orders    TSH with reflex to Free T4 if abnormal    Essential hypertension I10     Stable  Refilling meds at same dose.  Continue regular physical activity.  Continue weight loss efforts.  Recheck in 6 months         Relevant Medications    metoprolol succinate XL (Toprol-XL) 50 mg 24 hr tablet    Other Relevant Orders    CBC and Auto " Differential    Hyperlipidemia E78.5     Checking fasting lipid profile and adjust meds accordingly         Relevant Orders    Comprehensive Metabolic Panel    Lipid Panel

## 2024-01-16 NOTE — ASSESSMENT & PLAN NOTE
Stable  Refilling meds at same dose.  Continue regular physical activity.  Continue weight loss efforts.  Recheck in 6 months

## 2024-04-11 ENCOUNTER — TELEPHONE (OUTPATIENT)
Dept: PRIMARY CARE | Facility: CLINIC | Age: 74
End: 2024-04-11

## 2024-04-11 DIAGNOSIS — E78.2 MIXED HYPERLIPIDEMIA: ICD-10-CM

## 2024-04-11 DIAGNOSIS — E03.9 ACQUIRED HYPOTHYROIDISM: ICD-10-CM

## 2024-04-11 RX ORDER — ATORVASTATIN CALCIUM 40 MG/1
40 TABLET, FILM COATED ORAL DAILY
Qty: 90 TABLET | Refills: 0 | Status: SHIPPED | OUTPATIENT
Start: 2024-04-11

## 2024-04-11 RX ORDER — LEVOTHYROXINE SODIUM 88 UG/1
88 TABLET ORAL DAILY
Qty: 90 TABLET | Refills: 0 | OUTPATIENT
Start: 2024-04-11

## 2024-04-11 NOTE — TELEPHONE ENCOUNTER
Medication Name:l  levothyroxine  Atorvastain   Dose:  Frequency:  Pharmacy: [facility]  [place]  Quantity left: 3   Last appointment:6/19/23  Next appointment: 7/26/24

## 2024-04-12 ENCOUNTER — LAB (OUTPATIENT)
Dept: LAB | Facility: LAB | Age: 74
End: 2024-04-12
Payer: MEDICARE

## 2024-04-12 DIAGNOSIS — E03.9 ACQUIRED HYPOTHYROIDISM: ICD-10-CM

## 2024-04-12 DIAGNOSIS — E78.2 MIXED HYPERLIPIDEMIA: ICD-10-CM

## 2024-04-12 DIAGNOSIS — I10 ESSENTIAL HYPERTENSION: ICD-10-CM

## 2024-04-12 LAB
ALBUMIN SERPL BCP-MCNC: 4.3 G/DL (ref 3.4–5)
ALP SERPL-CCNC: 49 U/L (ref 33–136)
ALT SERPL W P-5'-P-CCNC: 20 U/L (ref 7–45)
ANION GAP SERPL CALC-SCNC: 13 MMOL/L (ref 10–20)
AST SERPL W P-5'-P-CCNC: 19 U/L (ref 9–39)
BASOPHILS # BLD AUTO: 0.03 X10*3/UL (ref 0–0.1)
BASOPHILS NFR BLD AUTO: 0.4 %
BILIRUB SERPL-MCNC: 0.6 MG/DL (ref 0–1.2)
BUN SERPL-MCNC: 17 MG/DL (ref 6–23)
CALCIUM SERPL-MCNC: 10.2 MG/DL (ref 8.6–10.6)
CHLORIDE SERPL-SCNC: 106 MMOL/L (ref 98–107)
CHOLEST SERPL-MCNC: 151 MG/DL (ref 0–199)
CHOLESTEROL/HDL RATIO: 2.7
CO2 SERPL-SCNC: 28 MMOL/L (ref 21–32)
CREAT SERPL-MCNC: 0.76 MG/DL (ref 0.5–1.05)
EGFRCR SERPLBLD CKD-EPI 2021: 82 ML/MIN/1.73M*2
EOSINOPHIL # BLD AUTO: 0.27 X10*3/UL (ref 0–0.4)
EOSINOPHIL NFR BLD AUTO: 4 %
ERYTHROCYTE [DISTWIDTH] IN BLOOD BY AUTOMATED COUNT: 12.3 % (ref 11.5–14.5)
GLUCOSE SERPL-MCNC: 115 MG/DL (ref 74–99)
HCT VFR BLD AUTO: 39.3 % (ref 36–46)
HDLC SERPL-MCNC: 55.1 MG/DL
HGB BLD-MCNC: 13.3 G/DL (ref 12–16)
IMM GRANULOCYTES # BLD AUTO: 0.02 X10*3/UL (ref 0–0.5)
IMM GRANULOCYTES NFR BLD AUTO: 0.3 % (ref 0–0.9)
LDLC SERPL CALC-MCNC: 75 MG/DL
LYMPHOCYTES # BLD AUTO: 2.66 X10*3/UL (ref 0.8–3)
LYMPHOCYTES NFR BLD AUTO: 39.1 %
MCH RBC QN AUTO: 31 PG (ref 26–34)
MCHC RBC AUTO-ENTMCNC: 33.8 G/DL (ref 32–36)
MCV RBC AUTO: 92 FL (ref 80–100)
MONOCYTES # BLD AUTO: 0.59 X10*3/UL (ref 0.05–0.8)
MONOCYTES NFR BLD AUTO: 8.7 %
NEUTROPHILS # BLD AUTO: 3.23 X10*3/UL (ref 1.6–5.5)
NEUTROPHILS NFR BLD AUTO: 47.5 %
NON HDL CHOLESTEROL: 96 MG/DL (ref 0–149)
NRBC BLD-RTO: 0 /100 WBCS (ref 0–0)
PLATELET # BLD AUTO: 189 X10*3/UL (ref 150–450)
POTASSIUM SERPL-SCNC: 4.5 MMOL/L (ref 3.5–5.3)
PROT SERPL-MCNC: 6.7 G/DL (ref 6.4–8.2)
RBC # BLD AUTO: 4.29 X10*6/UL (ref 4–5.2)
SODIUM SERPL-SCNC: 142 MMOL/L (ref 136–145)
TRIGL SERPL-MCNC: 106 MG/DL (ref 0–149)
TSH SERPL-ACNC: 1.03 MIU/L (ref 0.44–3.98)
VLDL: 21 MG/DL (ref 0–40)
WBC # BLD AUTO: 6.8 X10*3/UL (ref 4.4–11.3)

## 2024-04-12 PROCEDURE — 36415 COLL VENOUS BLD VENIPUNCTURE: CPT

## 2024-04-12 PROCEDURE — 80061 LIPID PANEL: CPT

## 2024-04-12 PROCEDURE — 85025 COMPLETE CBC W/AUTO DIFF WBC: CPT

## 2024-04-12 PROCEDURE — 80053 COMPREHEN METABOLIC PANEL: CPT

## 2024-04-12 PROCEDURE — 84443 ASSAY THYROID STIM HORMONE: CPT

## 2024-05-01 ENCOUNTER — TELEPHONE (OUTPATIENT)
Dept: PRIMARY CARE | Facility: CLINIC | Age: 74
End: 2024-05-01
Payer: MEDICARE

## 2024-05-01 DIAGNOSIS — I10 ESSENTIAL HYPERTENSION: ICD-10-CM

## 2024-05-01 RX ORDER — METOPROLOL SUCCINATE 50 MG/1
50 TABLET, EXTENDED RELEASE ORAL DAILY
Qty: 90 TABLET | Refills: 0 | Status: SHIPPED | OUTPATIENT
Start: 2024-05-01

## 2024-05-01 NOTE — TELEPHONE ENCOUNTER
Medication Name: Metoprolol Succinate XL   Dose: 50 mg 24 hr tablet   Frequency: 1 tablet by mouth once daily   Pharmacy: [facility]   Last appointment: 1/16/24  Next appointment: 7/16/24

## 2024-07-10 ENCOUNTER — TELEPHONE (OUTPATIENT)
Dept: PRIMARY CARE | Facility: CLINIC | Age: 74
End: 2024-07-10
Payer: MEDICARE

## 2024-07-10 DIAGNOSIS — E78.2 MIXED HYPERLIPIDEMIA: ICD-10-CM

## 2024-07-10 RX ORDER — ATORVASTATIN CALCIUM 40 MG/1
40 TABLET, FILM COATED ORAL DAILY
Qty: 90 TABLET | Refills: 0 | Status: SHIPPED | OUTPATIENT
Start: 2024-07-10

## 2024-07-10 NOTE — TELEPHONE ENCOUNTER
Pt called for a refill    Medication Name:  atorvastatin  Dose: 40 mg  Frequency: 1 daily  Pharmacy: [facility]  Quantity left:2  Last appointment:  1/16  Next appointment:  8/26

## 2024-07-16 ENCOUNTER — APPOINTMENT (OUTPATIENT)
Dept: PRIMARY CARE | Facility: CLINIC | Age: 74
End: 2024-07-16
Payer: MEDICARE

## 2024-07-29 ENCOUNTER — PATIENT MESSAGE (OUTPATIENT)
Dept: PRIMARY CARE | Facility: CLINIC | Age: 74
End: 2024-07-29
Payer: MEDICARE

## 2024-07-29 DIAGNOSIS — I10 ESSENTIAL HYPERTENSION: ICD-10-CM

## 2024-07-29 DIAGNOSIS — E03.9 ACQUIRED HYPOTHYROIDISM: ICD-10-CM

## 2024-07-29 RX ORDER — LEVOTHYROXINE SODIUM 88 UG/1
88 TABLET ORAL DAILY
Qty: 90 TABLET | Refills: 0 | Status: SHIPPED | OUTPATIENT
Start: 2024-07-29

## 2024-07-29 NOTE — TELEPHONE ENCOUNTER
Medication Name:  levothyroxine 88 mcg, metoprolol 50 mg  Dose:  Frequency:  Pharmacy:  [facility] [address]  Quantity left: 2 pills  Last appointment:  1/16/2024  Last CPE:  Last MCW:  Next appointment:  8/25/2024  Next CPE:  Next MCW:

## 2024-07-30 RX ORDER — METOPROLOL SUCCINATE 50 MG/1
50 TABLET, EXTENDED RELEASE ORAL DAILY
Qty: 90 TABLET | Refills: 0 | Status: SHIPPED | OUTPATIENT
Start: 2024-07-30

## 2024-08-12 ENCOUNTER — HOSPITAL ENCOUNTER (OUTPATIENT)
Dept: RADIOLOGY | Facility: HOSPITAL | Age: 74
Discharge: HOME | End: 2024-08-12
Payer: MEDICARE

## 2024-08-12 ENCOUNTER — OFFICE VISIT (OUTPATIENT)
Dept: ORTHOPEDIC SURGERY | Facility: HOSPITAL | Age: 74
End: 2024-08-12
Payer: MEDICARE

## 2024-08-12 DIAGNOSIS — M25.512 BILATERAL SHOULDER PAIN, UNSPECIFIED CHRONICITY: ICD-10-CM

## 2024-08-12 DIAGNOSIS — M54.2 NECK PAIN: ICD-10-CM

## 2024-08-12 DIAGNOSIS — M25.511 BILATERAL SHOULDER PAIN, UNSPECIFIED CHRONICITY: ICD-10-CM

## 2024-08-12 PROCEDURE — 1159F MED LIST DOCD IN RCRD: CPT | Performed by: ORTHOPAEDIC SURGERY

## 2024-08-12 PROCEDURE — 73030 X-RAY EXAM OF SHOULDER: CPT | Mod: 50

## 2024-08-12 PROCEDURE — 2500000005 HC RX 250 GENERAL PHARMACY W/O HCPCS: Performed by: ORTHOPAEDIC SURGERY

## 2024-08-12 PROCEDURE — 73030 X-RAY EXAM OF SHOULDER: CPT | Mod: BILATERAL PROCEDURE | Performed by: RADIOLOGY

## 2024-08-12 PROCEDURE — 20610 DRAIN/INJ JOINT/BURSA W/O US: CPT | Mod: LT | Performed by: ORTHOPAEDIC SURGERY

## 2024-08-12 PROCEDURE — 2500000004 HC RX 250 GENERAL PHARMACY W/ HCPCS (ALT 636 FOR OP/ED): Performed by: ORTHOPAEDIC SURGERY

## 2024-08-12 PROCEDURE — 99214 OFFICE O/P EST MOD 30 MIN: CPT | Performed by: ORTHOPAEDIC SURGERY

## 2024-08-12 PROCEDURE — 99214 OFFICE O/P EST MOD 30 MIN: CPT | Mod: 25 | Performed by: ORTHOPAEDIC SURGERY

## 2024-08-12 RX ORDER — TRIAMCINOLONE ACETONIDE 40 MG/ML
40 INJECTION, SUSPENSION INTRA-ARTICULAR; INTRAMUSCULAR
Status: COMPLETED | OUTPATIENT
Start: 2024-08-12 | End: 2024-08-12

## 2024-08-12 RX ORDER — LIDOCAINE HYDROCHLORIDE 10 MG/ML
4 INJECTION INFILTRATION; PERINEURAL
Status: COMPLETED | OUTPATIENT
Start: 2024-08-12 | End: 2024-08-12

## 2024-08-12 NOTE — PROGRESS NOTES
L Inj/Asp: L glenohumeral on 8/12/2024 2:15 PM  Indications: pain  Details: 22 G needle, posterior approach  Medications: 40 mg triamcinolone acetonide 40 mg/mL; 4 mL lidocaine 10 mg/mL (1 %)  Outcome: tolerated well, no immediate complications  Procedure, treatment alternatives, risks and benefits explained, specific risks discussed. Consent was given by the patient. Immediately prior to procedure a time out was called to verify the correct patient, procedure, equipment, support staff and site/side marked as required.

## 2024-08-12 NOTE — PROGRESS NOTES
Patient here for evaluation of her shoulders she has bilateral shoulder pain but definitely worse on the left than the right she has a known history of arthritis confirmed by MRI scan several years ago.  She has had injections and was wondering if an injection would be helpful.  She also has spasm in her neck and trapezius area on both sides.    The patient is pleasant and cooperative.  The patient is alert and oriented ×3.  Auditory function is intact.  The patient is a good historian.  The patient is not in acute distress.  Eye exam significant for nonicteric sclera, intact ocular muscle movement.  Breathing is rhythmic symmetric and nonlabored.  Patient does have painful arc of motion of her shoulder but full range of motion bilaterally.  Left side is painful on elevation and forward flexion and there is a click on circumduction.  Right side is minimally painful with full elevation.  Her motor power is well-preserved in both shoulders her radial pulses are easily palpable.  There is no peripheral edema integument over the left upper extremity is intact.    X-rays were obtained of both shoulders and there is minimal degenerative arthritis changes of the left shoulder and right shoulder appears completely intact with subacromial glenohumeral spaces well-preserved.    Glenohumeral arthritis left shoulder and cervical pain.    Patient has been counseled about options for treatment I have recommended a physical therapy consult for her cervical pain and also an intra-articular injection for her left shoulder pain.  Injection was performed today and tolerated well and the patient is to call next week to report the results.    This was dictated using voice recognition software and not corrected for grammatical or spelling errors.

## 2024-08-19 ENCOUNTER — APPOINTMENT (OUTPATIENT)
Dept: PHYSICAL THERAPY | Facility: CLINIC | Age: 74
End: 2024-08-19
Payer: MEDICARE

## 2024-08-20 ENCOUNTER — TELEPHONE (OUTPATIENT)
Dept: ORTHOPEDIC SURGERY | Facility: HOSPITAL | Age: 74
End: 2024-08-20
Payer: MEDICARE

## 2024-08-20 NOTE — TELEPHONE ENCOUNTER
Patient was seen 8/12/2024 for injection Left Shoulder. She left message on voicemail that the injection provided much relief and she is completing most activity with pain or issue.  She will call back if she has questions or concerns.

## 2024-08-21 ENCOUNTER — EVALUATION (OUTPATIENT)
Dept: PHYSICAL THERAPY | Facility: CLINIC | Age: 74
End: 2024-08-21
Payer: MEDICARE

## 2024-08-21 DIAGNOSIS — M54.2 NECK PAIN: ICD-10-CM

## 2024-08-21 PROCEDURE — 97161 PT EVAL LOW COMPLEX 20 MIN: CPT | Mod: GP | Performed by: PHYSICAL THERAPIST

## 2024-08-21 PROCEDURE — 97140 MANUAL THERAPY 1/> REGIONS: CPT | Mod: GP | Performed by: PHYSICAL THERAPIST

## 2024-08-21 ASSESSMENT — ENCOUNTER SYMPTOMS
DEPRESSION: 1
LOSS OF SENSATION IN FEET: 0
OCCASIONAL FEELINGS OF UNSTEADINESS: 0

## 2024-08-21 ASSESSMENT — PATIENT HEALTH QUESTIONNAIRE - PHQ9
1. LITTLE INTEREST OR PLEASURE IN DOING THINGS: NOT AT ALL
2. FEELING DOWN, DEPRESSED OR HOPELESS: NOT AT ALL
SUM OF ALL RESPONSES TO PHQ9 QUESTIONS 1 AND 2: 0

## 2024-08-21 NOTE — PROGRESS NOTES
Physical Therapy Evaluation    Patient Name: Velma Chapman  MRN: [id number]  Today's Date: 8/21/2024  Visit: 1/mn  DOS/DOI:   8/1/23  Medicare Certification Dates:  8/21/24 - 10/21/24  Referred by:   Geovanny Rene  Time Calculation  Start Time: 1435  Stop Time: 1515  Time Calculation (min): 40 min  PT Evaluation Time Entry  PT Evaluation (Low) Time Entry: 28  PT Therapeutic Procedures Time Entry  Manual Therapy Time Entry: 7  Therapeutic Exercise Time Entry: 5                 Diagnosis:   1. Neck pain  Referral to Physical Therapy    Follow Up In Physical Therapy              PMH  -  HTN, OA, thyroid disorder  - L shld OA    HPI  She first had neck pain in her 20's without injury or incident.  She had seen her MD and was callie that her vertebrae were misshaped.  She has done exercises every day which has helped.  About 1 year ago she dev neck pain without injury.  It may be from looking at her ipad too much.  She went to her MD.  She saw Dr. Rene and was referred to therapy.  No imaging done at this time.    Precautions  None    SUBJECTIVE  Symptoms:  - she reports R greater than L neck pain at the paraspinal area.  It doesn't radiate into B UE.  It is rated 6/10 and described as tight.  It is worse by evening.  No specific activity causes pain but it builds up and hurts by evening.  It decreases with cervical extension. She denies loss of ROM.   Functionally she is limited with prolonged driving (5-6 hrs).      Patient's Living Environment:  - lives with  whom she cares for since he had a CVA    Previous Level of Function:  - no trouble with driving long distances prior to a year ago    Patient's Therapy Goals:  - decreased pain and learn exercise to.    OBJECTIVE  Observation:    - R scap lower than L    Palpation:   - note increased tension with pain at B upper trap    AROM:  - Cervical flex = 43, ext = 30, SB = 12 R and 18 L, rot = 45 R and 32 L    Myotomes:  - C2-T1 = 5/5 B    MMT:    Neuro:  -  Reflex C5-7 = 2/4 B    Special Tests:  - Negative compression, distraction, PA C2-7  - Positive B spurlings (pain into shld B)    Joint Mobility:   - decreased L SB C2-7 though motion is present ( R SB = WFL C2-7)  - CV flex present B but stiff    Outcome Measure:  - NDI = 20%    ASSESSMENT  The patient is a 72 y/o female presenting to PT with chronic neck pain that has worsened over the last year.  She has decreased AROM into L rot and SB.  Joint mobility is decreased into L SB..  I suspect she has deg changes that have caused pain and decreased motion as well as muscle tension.  This decreases her ability to drive long distance.  She will benefit from PT to improve pain and ROM so she can callie driving long distances    Is skilled care required:  yes  Rehab potential:  good  Clinical presentation:  Stable and/or uncomplicated characteristics  Complexity:   . Low complexity due to patient's clinical presentation being stable and uncomplicated by any significant comorbidities that may affect rehab tolerance and progression.       Personal factors effecting care:  none    PLAN  Frequency/duration:  2x/wk x 6 wks  Planned Interventions:  MT, therapeutic exercise, neuro re-ed, modalities prn  Patient/caregiver agrees with POC:  yes    TODAY'S TREATMENT  - evaluation of the neck completed.  - Manual therapy:  STM to B upper trap, CV flex, s.o. release and traction  - she was given a HEP as seen below:  Access Code: [email or web address]  URL: [email or web address]  Date: 08/21/2024  Prepared by: Isidra Ruff    Exercises  - Supine Chin Tuck  - 1 x daily - 7 x weekly - 3 sets - 10 reps  - Prone Scapular Slide with Shoulder Extension  - 1 x daily - 7 x weekly - 3 sets - 10 reps  - Standing Shoulder Row with Anchored Resistance  - 1 x daily - 7 x weekly - 3 sets - 10 reps  - Seated Levator Scapulae Stretch  - 1 x daily - 7 x weekly - 1 sets - 3 reps - 30 sec hold    Goals:   Active       PT Problem       she  will be independent with her HEP       Start:  08/21/24    Expected End:  08/28/24            achieve full AROM into L SB and rot without pain       Start:  08/21/24    Expected End:  09/10/24            demonstrate full L SB joint mobility to promote full AROM       Start:  08/21/24    Expected End:  09/03/24            callie driving 5-6 hrs without increased symptoms       Start:  08/21/24    Expected End:  10/02/24

## 2024-08-21 NOTE — LETTER
August 21, 2024    Geovanny Rene MD  [id number]  Department Of Orthopedics  [facility] [address]    Patient: Velma Chapman   YOB: 1950   Date of Visit: 8/21/2024       Dear Geovanny Rene MD  [id number]  [place] [address]    The attached plan of care is being sent to you because your patient’s medical reimbursement requires that you certify the plan of care. Your signature is required to allow uninterrupted insurance coverage.      You may indicate your approval by signing below and faxing this form back to us at Dept Fax: [phone].    Please call Dept: [phone] with any questions or concerns.    Thank you for this referral,        Isidra Ruff, PT  [facility] [address] [place] [id number]    Payer: Payor: MEDICARE / Plan: MEDICARE PART A AND B / Product Type: *No Product type* /                                                                         Date:     Dear Isidra Ruff, PT,     Re: Ms. Velam Chapman, MRN:[id number]    I certify that I have reviewed the attached plan of care and it is medically necessary for Ms. Velma Chapman (1950) who is under my care.          ______________________________________                    _________________  Provider name and credentials                                           Date and time                                                                                      The following plan is in draft form.  Please refer to the current version for the most up-to-date information.                Plan of Care 8/21/24   Effective from: 8/21/2024  Effective to: 10/21/2024    Draft  Plan ID: [id number]               Participants as of 8/21/2024      Name Type Comments Contact Info    Geovanny Rene MD Referring Provider  [phone]    Isidra Ruff, PT Consulting Physician  [phone]          Last Plan Note       Author: Isidra SAUCEDO  Speedy, PT Status: Sign when Signing Visit Last edited: 8/21/2024  2:30 PM         Physical Therapy Evaluation    Patient Name: Velma Chapman  MRN: [id number]  Today's Date: 8/21/2024  Visit: tre  DOS/DOI:   8/1/23  Medicare Certification Dates:  8/21/24 - 10/21/24  Referred by:   Geovanny Rene  Time Calculation  Start Time: 1435  Stop Time: 1515  Time Calculation (min): 40 min  PT Evaluation Time Entry  PT Evaluation (Low) Time Entry: 28  PT Therapeutic Procedures Time Entry  Manual Therapy Time Entry: 7  Therapeutic Exercise Time Entry: 5                 Diagnosis:   1. Neck pain  Referral to Physical Therapy    Follow Up In Physical Therapy              PMH  -  HTN, OA, thyroid disorder  - L shld OA    HPI  She first had neck pain in her 20's without injury or incident.  She had seen her MD and was callie that her vertebrae were misshaped.  She has done exercises every day which has helped.  About 1 year ago she dev neck pain without injury.  It may be from looking at her ipad too much.  She went to her MD.  She saw Dr. Rene and was referred to therapy.  No imaging done at this time.    Precautions  None    SUBJECTIVE  Symptoms:  - she reports R greater than L neck pain at the paraspinal area.  It doesn't radiate into B UE.  It is rated 6/10 and described as tight.  It is worse by evening.  No specific activity causes pain but it builds up and hurts by evening.  It decreases with cervical extension. She denies loss of ROM.   Functionally she is limited with prolonged driving (5-6 hrs).      Patient's Living Environment:  - lives with  whom she cares for since he had a CVA    Previous Level of Function:  - no trouble with driving long distances prior to a year ago    Patient's Therapy Goals:  - decreased pain and learn exercise to.    OBJECTIVE  Observation:    - R scap lower than L    Palpation:   - note increased tension with pain at B upper trap    AROM:  - Cervical flex = 43, ext = 30, SB = 12  R and 18 L, rot = 45 R and 32 L    Myotomes:  - C2-T1 = 5/5 B    MMT:    Neuro:  - Reflex C5-7 = 2/4 B    Special Tests:  - Negative compression, distraction, PA C2-7  - Positive B spurlings (pain into shld B)    Joint Mobility:   - decreased L SB C2-7 though motion is present ( R SB = WFL C2-7)  - CV flex present B but stiff    Outcome Measure:  - NDI = 20%    ASSESSMENT  The patient is a 72 y/o female presenting to PT with chronic neck pain that has worsened over the last year.  She has decreased AROM into L rot and SB.  Joint mobility is decreased into L SB..  I suspect she has deg changes that have caused pain and decreased motion as well as muscle tension.  This decreases her ability to drive long distance.  She will benefit from PT to improve pain and ROM so she can callie driving long distances    Is skilled care required:  yes  Rehab potential:  good  Clinical presentation:  Stable and/or uncomplicated characteristics  Complexity:   . Low complexity due to patient's clinical presentation being stable and uncomplicated by any significant comorbidities that may affect rehab tolerance and progression.       Personal factors effecting care:  none    PLAN  Frequency/duration:  2x/wk x 6 wks  Planned Interventions:  MT, therapeutic exercise, neuro re-ed, modalities prn  Patient/caregiver agrees with POC:  yes    TODAY'S TREATMENT  - evaluation of the neck completed.  - Manual therapy:  STM to B upper trap, CV flex, s.o. release and traction  - she was given a HEP as seen below:  Access Code: [email or web address]  URL: [email or web address]  Date: 08/21/2024  Prepared by: Isidra Ruff    Exercises  - Supine Chin Tuck  - 1 x daily - 7 x weekly - 3 sets - 10 reps  - Prone Scapular Slide with Shoulder Extension  - 1 x daily - 7 x weekly - 3 sets - 10 reps  - Standing Shoulder Row with Anchored Resistance  - 1 x daily - 7 x weekly - 3 sets - 10 reps  - Seated Levator Scapulae Stretch  - 1 x daily - 7 x  weekly - 1 sets - 3 reps - 30 sec hold    Goals:   Active       PT Problem       she will be independent with her HEP       Start:  08/21/24    Expected End:  08/28/24            achieve full AROM into L SB and rot without pain       Start:  08/21/24    Expected End:  09/10/24            demonstrate full L SB joint mobility to promote full AROM       Start:  08/21/24    Expected End:  09/03/24            callie driving 5-6 hrs without increased symptoms       Start:  08/21/24    Expected End:  10/02/24

## 2024-08-26 ENCOUNTER — APPOINTMENT (OUTPATIENT)
Dept: PRIMARY CARE | Facility: CLINIC | Age: 74
End: 2024-08-26
Payer: MEDICARE

## 2024-08-26 ENCOUNTER — TREATMENT (OUTPATIENT)
Dept: PHYSICAL THERAPY | Facility: CLINIC | Age: 74
End: 2024-08-26
Payer: MEDICARE

## 2024-08-26 DIAGNOSIS — M54.2 NECK PAIN: Primary | ICD-10-CM

## 2024-08-26 PROCEDURE — 97110 THERAPEUTIC EXERCISES: CPT | Mod: GP | Performed by: PHYSICAL THERAPIST

## 2024-08-26 PROCEDURE — 97140 MANUAL THERAPY 1/> REGIONS: CPT | Mod: GP | Performed by: PHYSICAL THERAPIST

## 2024-08-26 NOTE — PROGRESS NOTES
Physical Therapy Treatment    Patient Name: Velma Chapman  MRN: [id number]  Today's Date: 8/26/2024  Visit 2/mn  DOS/DOI:   8/1/23  Medicare Certification Dates:  8/21/24 - 10/21/24  Referred by:   Geovanny Rene  Time Calculation  Start Time: 0918  Stop Time: 1003  Time Calculation (min): 45 min     PT Therapeutic Procedures Time Entry  Manual Therapy Time Entry: 18  Therapeutic Exercise Time Entry: 27                 Diagnosis:   1. Neck pain              PRECAUTIONS:  none    SUBJECTIVE:  0/10 pain at present but she hasn't done a lot of activity.  HEP compliance: yes    OBJECTIVE:  - AROM cervical SB = 20 R and 10 L  - stiff with joint mobility C2-5    Treatment:  - initiate exercise for posture, ROM and cervical stability.  Therapeutic Exercise  Therapeutic Exercise Activity 1: CV flex 2x12  Therapeutic Exercise Activity 2: cervical isometrics all dir, x12  Therapeutic Exercise Activity 3: row 35 lbs, 2x12  Therapeutic Exercise Activity 4: prone shld ext 2x12  Manual Therapy  Manual Therapy Activity 1: CV flex mob  Manual Therapy Activity 2: L SB mob C2-7  Manual Therapy Activity 3: s.o. release  Manual Therapy Activity 4: STM to B upper traps             ASSESSMENT:  0/10 pain after rx.  Improve joint mobility into L SB noted after rx.  She will benefit from continued therapy to improve ROM, upper back strength and cervical stability for reduced neck pain and improved ability to drive.    PLAN:  Add lat pull down

## 2024-08-28 ENCOUNTER — TREATMENT (OUTPATIENT)
Dept: PHYSICAL THERAPY | Facility: CLINIC | Age: 74
End: 2024-08-28
Payer: MEDICARE

## 2024-08-28 DIAGNOSIS — M54.2 NECK PAIN: ICD-10-CM

## 2024-08-28 PROCEDURE — 97110 THERAPEUTIC EXERCISES: CPT | Mod: GP | Performed by: PHYSICAL THERAPIST

## 2024-08-28 PROCEDURE — 97140 MANUAL THERAPY 1/> REGIONS: CPT | Mod: GP | Performed by: PHYSICAL THERAPIST

## 2024-08-28 NOTE — PROGRESS NOTES
Physical Therapy Treatment    Patient Name: Velma Chapman  MRN: [id number]  Today's Date: 8/28/2024  Visit 3/mn  DOS/DOI:   8/1/23  Medicare Certification Dates:  8/21/24 - 10/21/24  Referred by:   Geovanny Rene  Time Calculation  Start Time: 1405  Stop Time: 1448  Time Calculation (min): 43 min     PT Therapeutic Procedures Time Entry  Manual Therapy Time Entry: 15  Therapeutic Exercise Time Entry: 28                 Diagnosis:   1. Neck pain  Follow Up In Physical Therapy            PRECAUTIONS:  none    SUBJECTIVE:  She reports intermittent pain at the L upper trap and superior aspect of B shld rated 3/10.  It started yesterday.  She did golf yesterday which may have contributed.    HEP compliance: yes    OBJECTIVE:  - decreased joint mobility into SB C2-4 B  - AROM cervical SB = 15 B    Treatment:  - continued with exercise for cervical stability and ROM. Added lat pull down.  Therapeutic Exercise  Therapeutic Exercise Activity 1: CV flex 2x12  Therapeutic Exercise Activity 2: cervical isometrics all dir, x12  Therapeutic Exercise Activity 3: row 35 lbs, 2x12  Therapeutic Exercise Activity 4: prone shld ext 2x12  Therapeutic Exercise Activity 5: lat pull down 35 lbs, 2x12  Manual Therapy  Manual Therapy Activity 1: CV flex mob  Manual Therapy Activity 2: SB mob B C2-7  Manual Therapy Activity 3: s.o. release  Manual Therapy Activity 4: STM to B upper traps  Manual Therapy Activity 5: R and L flex mob , B to upper cervical spine             ASSESSMENT:  Callie rx well.  No pain with exercise.  She demonstrates equal AROM into SB B now.  She will benefit from PT to further reduce pain, improve ROM and increase strength of the upper back to improve posture and spinal mechanics.      PLAN:  Increase reps as callie.

## 2024-09-03 ENCOUNTER — TREATMENT (OUTPATIENT)
Dept: PHYSICAL THERAPY | Facility: CLINIC | Age: 74
End: 2024-09-03
Payer: MEDICARE

## 2024-09-03 DIAGNOSIS — M54.2 NECK PAIN: ICD-10-CM

## 2024-09-03 PROCEDURE — 97140 MANUAL THERAPY 1/> REGIONS: CPT | Mod: GP | Performed by: PHYSICAL THERAPIST

## 2024-09-03 PROCEDURE — 97110 THERAPEUTIC EXERCISES: CPT | Mod: GP | Performed by: PHYSICAL THERAPIST

## 2024-09-03 NOTE — PROGRESS NOTES
Physical Therapy Treatment    Patient Name: Velma Chapman  MRN: [id number]  Today's Date: 9/3/2024  Visit 4/mn  DOS/DOI:   8/1/23  Medicare Certification Dates:  8/21/24 - 10/21/24  Referred by:   Geovanny Rene  Time Calculation  Start Time: 1216  Stop Time: 1300  Time Calculation (min): 44 min     PT Therapeutic Procedures Time Entry  Manual Therapy Time Entry: 14  Therapeutic Exercise Time Entry: 30                 Diagnosis:   1. Neck pain  Follow Up In Physical Therapy            PRECAUTIONS:  none    SUBJECTIVE:  0/10 pain.  She has no limits with ADLS.  HEP compliance: yes.  She does have neck pain with upper trap stretch    OBJECTIVE:  - good joint mobility into SB C2-7 B  - decreased knots noted with palpation of B upper traps    Treatment:  - continued with exercise for cervical stability.  Increased reps as callie.  Advanced to prone shld abd vs ext.  Added UBE.  Therapeutic Exercise  Therapeutic Exercise Activity 1: CV flex 3x12  Therapeutic Exercise Activity 2: cervical isometrics all dir, x12  Therapeutic Exercise Activity 3: row 35 lbs, 3x12  Therapeutic Exercise Activity 4: prone shld abd 2x12  Therapeutic Exercise Activity 5: lat pull down 35 lbs, 2x12  Therapeutic Exercise Activity 6: UBE L1, 5 min  Manual Therapy  Manual Therapy Activity 1: SB mob B C2-7  Manual Therapy Activity 2: STM to B upper traps  Manual Therapy Activity 3: s.o. release             ASSESSMENT:  Callie rx well.  No pain after rx.  Joint mobility improved.  Anticipate discharge soon due to progress, no pain and ability to do all ADLS.    PLAN:  Increase exercise as callie.  Add shld flex/abd.

## 2024-09-04 ENCOUNTER — APPOINTMENT (OUTPATIENT)
Dept: PRIMARY CARE | Facility: CLINIC | Age: 74
End: 2024-09-04
Payer: MEDICARE

## 2024-09-04 VITALS
BODY MASS INDEX: 25.58 KG/M2 | WEIGHT: 163 LBS | OXYGEN SATURATION: 96 % | DIASTOLIC BLOOD PRESSURE: 64 MMHG | TEMPERATURE: 98 F | HEART RATE: 66 BPM | HEIGHT: 67 IN | SYSTOLIC BLOOD PRESSURE: 121 MMHG

## 2024-09-04 DIAGNOSIS — Z11.59 SCREENING FOR VIRAL DISEASE: ICD-10-CM

## 2024-09-04 DIAGNOSIS — I10 ESSENTIAL HYPERTENSION: ICD-10-CM

## 2024-09-04 DIAGNOSIS — Z78.0 MENOPAUSE: ICD-10-CM

## 2024-09-04 DIAGNOSIS — E03.9 ACQUIRED HYPOTHYROIDISM: ICD-10-CM

## 2024-09-04 DIAGNOSIS — R73.03 PRE-DIABETES: ICD-10-CM

## 2024-09-04 DIAGNOSIS — E78.2 MIXED HYPERLIPIDEMIA: ICD-10-CM

## 2024-09-04 DIAGNOSIS — K52.832 LYMPHOCYTIC COLITIS: ICD-10-CM

## 2024-09-04 DIAGNOSIS — Z00.00 MEDICARE ANNUAL WELLNESS VISIT, SUBSEQUENT: Primary | ICD-10-CM

## 2024-09-04 DIAGNOSIS — F51.01 PRIMARY INSOMNIA: ICD-10-CM

## 2024-09-04 DIAGNOSIS — Z12.31 ENCOUNTER FOR SCREENING MAMMOGRAM FOR MALIGNANT NEOPLASM OF BREAST: ICD-10-CM

## 2024-09-04 PROCEDURE — 3074F SYST BP LT 130 MM HG: CPT | Performed by: FAMILY MEDICINE

## 2024-09-04 PROCEDURE — 99214 OFFICE O/P EST MOD 30 MIN: CPT | Performed by: FAMILY MEDICINE

## 2024-09-04 PROCEDURE — 1170F FXNL STATUS ASSESSED: CPT | Performed by: FAMILY MEDICINE

## 2024-09-04 PROCEDURE — 1158F ADVNC CARE PLAN TLK DOCD: CPT | Performed by: FAMILY MEDICINE

## 2024-09-04 PROCEDURE — 3078F DIAST BP <80 MM HG: CPT | Performed by: FAMILY MEDICINE

## 2024-09-04 PROCEDURE — 1036F TOBACCO NON-USER: CPT | Performed by: FAMILY MEDICINE

## 2024-09-04 PROCEDURE — 3008F BODY MASS INDEX DOCD: CPT | Performed by: FAMILY MEDICINE

## 2024-09-04 PROCEDURE — G0439 PPPS, SUBSEQ VISIT: HCPCS | Performed by: FAMILY MEDICINE

## 2024-09-04 PROCEDURE — 1159F MED LIST DOCD IN RCRD: CPT | Performed by: FAMILY MEDICINE

## 2024-09-04 PROCEDURE — 1126F AMNT PAIN NOTED NONE PRSNT: CPT | Performed by: FAMILY MEDICINE

## 2024-09-04 PROCEDURE — 1123F ACP DISCUSS/DSCN MKR DOCD: CPT | Performed by: FAMILY MEDICINE

## 2024-09-04 RX ORDER — SPIRONOLACTONE 25 MG/1
75 TABLET ORAL
COMMUNITY
Start: 2024-08-06 | End: 2024-09-04 | Stop reason: SDUPTHER

## 2024-09-04 RX ORDER — METOPROLOL SUCCINATE 50 MG/1
50 TABLET, EXTENDED RELEASE ORAL DAILY
Qty: 90 TABLET | Refills: 0 | Status: SHIPPED | OUTPATIENT
Start: 2024-09-04

## 2024-09-04 RX ORDER — HYDROCORTISONE 25 MG/G
OINTMENT TOPICAL
COMMUNITY
Start: 2024-05-28

## 2024-09-04 RX ORDER — DEXTROMETHORPHAN HYDROBROMIDE, GUAIFENESIN 5; 100 MG/5ML; MG/5ML
650 LIQUID ORAL EVERY 8 HOURS PRN
COMMUNITY

## 2024-09-04 RX ORDER — TRETINOIN 0.25 MG/G
CREAM TOPICAL
COMMUNITY

## 2024-09-04 RX ORDER — SPIRONOLACTONE 25 MG/1
75 TABLET ORAL DAILY
Qty: 270 TABLET | Refills: 1 | Status: SHIPPED | OUTPATIENT
Start: 2024-09-04

## 2024-09-04 RX ORDER — LEVOTHYROXINE SODIUM 88 UG/1
88 TABLET ORAL DAILY
Qty: 90 TABLET | Refills: 0 | Status: SHIPPED | OUTPATIENT
Start: 2024-09-04

## 2024-09-04 RX ORDER — ATORVASTATIN CALCIUM 40 MG/1
40 TABLET, FILM COATED ORAL DAILY
Qty: 90 TABLET | Refills: 0 | Status: SHIPPED | OUTPATIENT
Start: 2024-09-04

## 2024-09-04 ASSESSMENT — ENCOUNTER SYMPTOMS
OCCASIONAL FEELINGS OF UNSTEADINESS: 0
LOSS OF SENSATION IN FEET: 0
DEPRESSION: 0

## 2024-09-04 ASSESSMENT — PATIENT HEALTH QUESTIONNAIRE - PHQ9
5. POOR APPETITE OR OVEREATING: NOT AT ALL
2. FEELING DOWN, DEPRESSED OR HOPELESS: SEVERAL DAYS
4. FEELING TIRED OR HAVING LITTLE ENERGY: NEARLY EVERY DAY
1. LITTLE INTEREST OR PLEASURE IN DOING THINGS: NOT AT ALL
10. IF YOU CHECKED OFF ANY PROBLEMS, HOW DIFFICULT HAVE THESE PROBLEMS MADE IT FOR YOU TO DO YOUR WORK, TAKE CARE OF THINGS AT HOME, OR GET ALONG WITH OTHER PEOPLE: NOT DIFFICULT AT ALL
7. TROUBLE CONCENTRATING ON THINGS, SUCH AS READING THE NEWSPAPER OR WATCHING TELEVISION: NOT AT ALL
9. THOUGHTS THAT YOU WOULD BE BETTER OFF DEAD, OR OF HURTING YOURSELF: NOT AT ALL
SUM OF ALL RESPONSES TO PHQ QUESTIONS 1-9: 5
8. MOVING OR SPEAKING SO SLOWLY THAT OTHER PEOPLE COULD HAVE NOTICED. OR THE OPPOSITE, BEING SO FIGETY OR RESTLESS THAT YOU HAVE BEEN MOVING AROUND A LOT MORE THAN USUAL: NOT AT ALL
6. FEELING BAD ABOUT YOURSELF - OR THAT YOU ARE A FAILURE OR HAVE LET YOURSELF OR YOUR FAMILY DOWN: NOT AT ALL
3. TROUBLE FALLING OR STAYING ASLEEP OR SLEEPING TOO MUCH: SEVERAL DAYS
SUM OF ALL RESPONSES TO PHQ9 QUESTIONS 1 AND 2: 1

## 2024-09-04 ASSESSMENT — ANXIETY QUESTIONNAIRES
7. FEELING AFRAID AS IF SOMETHING AWFUL MIGHT HAPPEN: NOT AT ALL
2. NOT BEING ABLE TO STOP OR CONTROL WORRYING: NOT AT ALL
6. BECOMING EASILY ANNOYED OR IRRITABLE: NEARLY EVERY DAY
1. FEELING NERVOUS, ANXIOUS, OR ON EDGE: NOT AT ALL
3. WORRYING TOO MUCH ABOUT DIFFERENT THINGS: NEARLY EVERY DAY
GAD7 TOTAL SCORE: 6
5. BEING SO RESTLESS THAT IT IS HARD TO SIT STILL: NOT AT ALL
4. TROUBLE RELAXING: NOT AT ALL
IF YOU CHECKED OFF ANY PROBLEMS ON THIS QUESTIONNAIRE, HOW DIFFICULT HAVE THESE PROBLEMS MADE IT FOR YOU TO DO YOUR WORK, TAKE CARE OF THINGS AT HOME, OR GET ALONG WITH OTHER PEOPLE: NOT DIFFICULT AT ALL

## 2024-09-04 ASSESSMENT — ACTIVITIES OF DAILY LIVING (ADL)
TAKING_MEDICATION: INDEPENDENT
GROCERY_SHOPPING: INDEPENDENT
DRESSING: INDEPENDENT
DOING_HOUSEWORK: INDEPENDENT
BATHING: INDEPENDENT
MANAGING_FINANCES: INDEPENDENT

## 2024-09-04 ASSESSMENT — PAIN SCALES - GENERAL: PAINLEVEL: 0-NO PAIN

## 2024-09-04 NOTE — ASSESSMENT & PLAN NOTE
Recent tsh normal  Orders:    levothyroxine (Synthroid, Levoxyl) 88 mcg tablet; Take 1 tablet (88 mcg) by mouth once daily.

## 2024-09-04 NOTE — PROGRESS NOTES
Subjective   Reason for Visit: Velma Chapman is an 73 y.o. female here for a Medicare Wellness visit.     Past Medical, Surgical, and Family History reviewed and updated in chart.    Reviewed all medications by prescribing practitioner or clinical pharmacist (such as prescriptions, OTCs, herbal therapies and supplements) and documented in the medical record.  Medicare Wellness Billing Compliance Satisfied    *This is a visual tool to show completion of required items on the day of the visit. Green checks will only appear on the date of visit.    Review all medications by prescribing practitioner or clinical pharmacist (such as prescriptions, OTCs, herbal therapies and supplements) documented in the medical record    Past Medical, Surgical, and Family History reviewed and updated in chart    Tobacco Use Reviewed    Alcohol Use Reviewed    Illicit Drug Use Reviewed    PHQ2/9    Falls in Last Year Reviewed    Home Safety Risk Factors Reviewed    Cognitive Impairment Reviewed    Patient Self Assessment and Health Status    Current Diet Reviewed    Exercise Frequency    ADL - Hearing Impairment    ADL - Bathing    ADL - Dressing    ADL - Walks in Home    IADL - Managing Finances    IADL - Grocery Shopping    IADL - Taking Medications    IADL - Doing Housework      Here for general check and wellness exam.    Patient has a history of hypertension, hyperlipidemia, hypothyroidism, coronary artery disease, lymphocytic colitis and insomnia.     Would like ears checked  Itching  Using hydrocortisone for flakes and itching    Sleeping well, taking CBD gummies.   Diet is good  Likes veggies, fiber  Likes fruit    She is taking all her medications daily.  Last labs completed were all essentially normal.  Blood sugar slight elevated in the prediabetic range  Admits to fat[place]ton, no energy    Golfing regular.   Walking regular.   Bm's are off and on  Florastor helps.     MAMM- 01/23  Bone- 02/20  Colon- 02/21 re  "check 10yrs  Flu- Declined    Feeling anxious   Does not have any get up and go            Patient Care Team:  Jessa Kaufman Pla, DO as PCP - General  Jessa Kaufman Pla, DO as PCP - [facility] Attributed Provider     Review of Systems    Objective   Vitals:  /64 (BP Location: Left arm, Patient Position: Sitting, BP Cuff Size: Adult)   Pulse 66   Temp 36.7 °C (98 °F) (Temporal)   Ht 1.689 m (5' 6.5\")   Wt 73.9 kg (163 lb)   SpO2 96%   BMI 25.91 kg/m²       Physical Exam  Vitals and nursing note reviewed.   Constitutional:       Appearance: Normal appearance.   Cardiovascular:      Rate and Rhythm: Normal rate and regular rhythm.   Pulmonary:      Effort: Pulmonary effort is normal.      Breath sounds: Normal breath sounds.   Musculoskeletal:      Cervical back: Normal range of motion.   Neurological:      Mental Status: She is alert.   Psychiatric:         Mood and Affect: Mood normal.         Behavior: Behavior normal.         Thought Content: Thought content normal.         Judgment: Judgment normal.         Assessment & Plan  Medicare annual wellness visit, subsequent  Schedule fasting labs, mammogram, bone density.  Discussed advance care planning.  Declines immunizations.         Essential hypertension  Stable  Refilling medication at same dose.  Checking basic labs.  Recheck in 6 months  Orders:    CBC and Auto Differential; Future    metoprolol succinate XL (Toprol-XL) 50 mg 24 hr tablet; Take 1 tablet (50 mg) by mouth once daily. Do not crush or chew.    spironolactone (Aldactone) 25 mg tablet; Take 3 tablets (75 mg) by mouth once daily.    Mixed hyperlipidemia  Checking fasting lipid profile and adjust meds accordingly  Orders:    Comprehensive Metabolic Panel; Future    Lipid Panel; Future    atorvastatin (Lipitor) 40 mg tablet; Take 1 tablet (40 mg) by mouth once daily.    Acquired hypothyroidism  Recent tsh normal  Orders:    levothyroxine (Synthroid, Levoxyl) 88 mcg tablet; Take 1 tablet " (88 mcg) by mouth once daily.    Lymphocytic colitis  Stable  Continue care per gastroenterology       Primary insomnia  Stable.  Continue OTC supplement       Encounter for screening mammogram for malignant neoplasm of breast    Orders:    BI mammo bilateral screening tomosynthesis; Future    Screening for viral disease    Orders:    Hepatitis C Antibody; Future    Menopause    Orders:    XR DEXA bone density; Future    Pre-diabetes  Checking fasting labs and A1c level and treat accordingly  Orders:    Hemoglobin A1C; Future

## 2024-09-04 NOTE — ASSESSMENT & PLAN NOTE
Schedule fasting labs, mammogram, bone density.  Discussed advance care planning.  Declines immunizations.

## 2024-09-04 NOTE — ASSESSMENT & PLAN NOTE
Stable  Refilling medication at same dose.  Checking basic labs.  Recheck in 6 months  Orders:    CBC and Auto Differential; Future    metoprolol succinate XL (Toprol-XL) 50 mg 24 hr tablet; Take 1 tablet (50 mg) by mouth once daily. Do not crush or chew.    spironolactone (Aldactone) 25 mg tablet; Take 3 tablets (75 mg) by mouth once daily.

## 2024-09-04 NOTE — ASSESSMENT & PLAN NOTE
Checking fasting lipid profile and adjust meds accordingly  Orders:    Comprehensive Metabolic Panel; Future    Lipid Panel; Future    atorvastatin (Lipitor) 40 mg tablet; Take 1 tablet (40 mg) by mouth once daily.

## 2024-09-05 ENCOUNTER — APPOINTMENT (OUTPATIENT)
Dept: PHYSICAL THERAPY | Facility: CLINIC | Age: 74
End: 2024-09-05
Payer: MEDICARE

## 2024-09-06 ENCOUNTER — TREATMENT (OUTPATIENT)
Dept: PHYSICAL THERAPY | Facility: CLINIC | Age: 74
End: 2024-09-06
Payer: MEDICARE

## 2024-09-06 DIAGNOSIS — M54.2 NECK PAIN: ICD-10-CM

## 2024-09-06 PROCEDURE — 97110 THERAPEUTIC EXERCISES: CPT | Mod: GP | Performed by: PHYSICAL THERAPIST

## 2024-09-06 PROCEDURE — 97140 MANUAL THERAPY 1/> REGIONS: CPT | Mod: GP | Performed by: PHYSICAL THERAPIST

## 2024-09-06 NOTE — PROGRESS NOTES
Physical Therapy Treatment    Patient Name: Velma Chapman  MRN: [id number]  Today's Date: 9/6/2024  Visit 5/mn  DOS/DOI:   8/1/23  Medicare Certification Dates:  8/21/24 - 10/21/24  Referred by:   Geovanny Rene  Time Calculation  Start Time: 1219  Stop Time: 1301  Time Calculation (min): 42 min     PT Therapeutic Procedures Time Entry  Manual Therapy Time Entry: 12  Therapeutic Exercise Time Entry: 30                 Diagnosis:   1. Neck pain  Follow Up In Physical Therapy            PRECAUTIONS:  none    SUBJECTIVE:  She had increased neck pain the day after her last session.  However she has had no pain since.  0/10 pain at present.  HEP compliance: yes    OBJECTIVE:  - Cervical AROM flex = 45, ext = 22, SB = 10 R and 5 L, rot = 40 B  - full SB joint mobility in supine C2-7 B    Treatment:  - continued with exercise for cervical stability.  Added shld flex with weight.  Therapeutic Exercise  Therapeutic Exercise Activity 1: CV flex 3x12  Therapeutic Exercise Activity 2: cervical isometrics all dir, x12  Therapeutic Exercise Activity 3: row 35 lbs, 3x12  Therapeutic Exercise Activity 4: prone shld abd 2x12  Therapeutic Exercise Activity 5: lat pull down 35 lbs, 2x12  Therapeutic Exercise Activity 6: UBE L1, 5 min  Therapeutic Exercise Activity 7: shld flex, 2 lb 2x12  Manual Therapy  Manual Therapy Activity 1: SB mob B C2-7  Manual Therapy Activity 2: STM to B upper traps  Manual Therapy Activity 3: s.o. release             ASSESSMENT:  Roxann rx well.  No pain with exercises.  She continues to be limited into SB (L worst) but has no pain with ROM now.  She has progressed well in regard to her pain and is at the point where she can continue independently with a HEP.      PLAN:  She will continue independently for 2-3 wks and then return for discharge

## 2024-09-11 ENCOUNTER — HOSPITAL ENCOUNTER (OUTPATIENT)
Dept: RADIOLOGY | Facility: CLINIC | Age: 74
Discharge: HOME | End: 2024-09-11
Payer: MEDICARE

## 2024-09-11 VITALS — WEIGHT: 163 LBS | BODY MASS INDEX: 25.58 KG/M2 | HEIGHT: 67 IN

## 2024-09-11 DIAGNOSIS — Z12.31 ENCOUNTER FOR SCREENING MAMMOGRAM FOR MALIGNANT NEOPLASM OF BREAST: ICD-10-CM

## 2024-09-11 PROCEDURE — 77067 SCR MAMMO BI INCL CAD: CPT

## 2024-09-13 ENCOUNTER — HOSPITAL ENCOUNTER (OUTPATIENT)
Dept: RADIOLOGY | Facility: CLINIC | Age: 74
Discharge: HOME | End: 2024-09-13
Payer: MEDICARE

## 2024-09-13 DIAGNOSIS — Z78.0 MENOPAUSE: ICD-10-CM

## 2024-09-13 PROCEDURE — 77080 DXA BONE DENSITY AXIAL: CPT

## 2024-09-20 ENCOUNTER — HOSPITAL ENCOUNTER (OUTPATIENT)
Dept: RADIOLOGY | Facility: EXTERNAL LOCATION | Age: 74
Discharge: HOME | End: 2024-09-20

## 2024-09-30 ENCOUNTER — TREATMENT (OUTPATIENT)
Dept: PHYSICAL THERAPY | Facility: CLINIC | Age: 74
End: 2024-09-30
Payer: MEDICARE

## 2024-09-30 DIAGNOSIS — M54.2 NECK PAIN: Primary | ICD-10-CM

## 2024-09-30 PROCEDURE — 97110 THERAPEUTIC EXERCISES: CPT | Mod: GP | Performed by: PHYSICAL THERAPIST

## 2024-09-30 PROCEDURE — 97140 MANUAL THERAPY 1/> REGIONS: CPT | Mod: GP | Performed by: PHYSICAL THERAPIST

## 2024-09-30 NOTE — PROGRESS NOTES
Physical Therapy Treatment/Discharge    Patient Name: Velma Chapman  MRN: [id number]  Today's Date: 9/30/2024  Visit 6/mn  DOS/DOI:   8/1/23  Medicare Certification Dates:  8/21/24 - 10/21/24  Referred by:   Geovanny Rene  Time Calculation  Start Time: 1233  Stop Time: 1323  Time Calculation (min): 50 min     PT Therapeutic Procedures Time Entry  Manual Therapy Time Entry: 17  Therapeutic Exercise Time Entry: 33                 Diagnosis:   1. Neck pain  Follow Up In Physical Therapy            PRECAUTIONS:  none    SUBJECTIVE:  She has been doing well over the last few weeks.  She was able to do a 9 hour drive and had no problem.  yesterday she dev pain in the L upper trap area without known cause.  It may have been from her pillow.  She switched to a supportive pillow and had no problem.  It is rated 5/10.  No loss of motion noted.    HEP compliance: partially    OBJECTIVE:  - AROM cervical flex = 35, ext = 25, SB = 18 B, rot = 40 R and 45 L.  No pain any dir.  - decreased joint mobility into L SB at C3/4  - NDI = 12% (20% at eval)    Treatment:  - continued with exercise for neck stability and posture.  Therapeutic Exercise  Therapeutic Exercise Activity 1: CV flex 3x12  Therapeutic Exercise Activity 2: cervical isometrics all dir, x12  Therapeutic Exercise Activity 3: row 35 lbs, 3x12  Therapeutic Exercise Activity 4: prone shld abd/ext 3x12  Therapeutic Exercise Activity 5: lat pull down 35 lbs, 3x12  Therapeutic Exercise Activity 6: UBE L1, 5 min  Therapeutic Exercise Activity 7: shld flex, 2 lb 2x12  Manual Therapy  Manual Therapy Activity 1: SB mob B C2-7  Manual Therapy Activity 2: STM to L upper trap  Manual Therapy Activity 3: traction  Manual Therapy Activity 4: s.o. release  Manual Therapy Activity 5: L scalene stretch           - she was given a HEP as seen below:    Access Code: [email or web address]  URL: [email or web address]  Date: 09/30/2024  Prepared by: Isidra  Kirschbaum    Exercises  - Standing Shoulder Row with Anchored Resistance  - 1 x daily - 4 x weekly - 3 sets - 10 reps  - Shoulder extension with resistance - Neutral  - 1 x daily - 4 x weekly - 3 sets - 10 reps  - Prone Scapular Slide with Shoulder Extension  - 1 x daily - 4 x weekly - 3 sets - 10 reps  - Prone Scapular Retraction Arms at Side  - 1 x daily - 4 x weekly - 3 sets - 10 reps  - Standing Shoulder Flexion to 90 Degrees with Dumbbells  - 1 x daily - 4 x weekly - 3 sets - 10 reps  - Supine Chin Tuck  - 1 x daily - 4 x weekly - 3 sets - 10 reps  - Standing Isometric Cervical Sidebending with Manual Resistance  - 1 x daily - 4 x weekly - 3 sets - 10 reps  - Standing Isometric Cervical Flexion with Manual Resistance  - 1 x daily - 4 x weekly - 3 sets - 10 reps  - Standing Isometric Cervical Extension with Manual Resistance  - 1 x daily - 4 x weekly - 3 sets - 10 reps  - Seated Isometric Cervical Rotation  - 1 x daily - 4 x weekly - 3 sets - 10 reps    ASSESSMENT:  Callie rx well - no pain at the L upper trap after today's session.  She demonstrates WFL AROM all dir without pain.  No limitations with ADLs.  She has met all goals or is adequate for discharge.  She is able to continue independently with a HEP.    Resolved       PT Problem       she will be independent with her HEP (Met)       Start:  08/21/24    Expected End:  08/28/24    Resolved:  09/30/24         achieve full AROM into L SB and rot without pain (Met)       Start:  08/21/24    Expected End:  09/10/24    Resolved:  09/30/24         demonstrate full L SB joint mobility to promote full AROM (Adequate for Discharge)       Start:  08/21/24    Expected End:  09/03/24    Resolved:  09/30/24         callie driving 5-6 hrs without increased symptoms (Met)       Start:  08/21/24    Expected End:  10/02/24    Resolved:  09/30/24           PLAN:  Discharge to HEP.

## 2024-10-21 DIAGNOSIS — E78.2 MIXED HYPERLIPIDEMIA: ICD-10-CM

## 2024-10-21 DIAGNOSIS — I10 ESSENTIAL HYPERTENSION: ICD-10-CM

## 2024-10-21 DIAGNOSIS — E03.9 ACQUIRED HYPOTHYROIDISM: ICD-10-CM

## 2024-10-21 RX ORDER — ATORVASTATIN CALCIUM 40 MG/1
40 TABLET, FILM COATED ORAL DAILY
Qty: 90 TABLET | Refills: 1 | Status: SHIPPED | OUTPATIENT
Start: 2024-10-21

## 2024-10-21 RX ORDER — METOPROLOL SUCCINATE 50 MG/1
50 TABLET, EXTENDED RELEASE ORAL DAILY
Qty: 90 TABLET | Refills: 0 | Status: SHIPPED | OUTPATIENT
Start: 2024-10-21

## 2024-10-21 RX ORDER — LEVOTHYROXINE SODIUM 88 UG/1
88 TABLET ORAL DAILY
Qty: 90 TABLET | Refills: 2 | Status: SHIPPED | OUTPATIENT
Start: 2024-10-21

## 2024-11-21 ENCOUNTER — LAB (OUTPATIENT)
Dept: LAB | Facility: LAB | Age: 74
End: 2024-11-21
Payer: MEDICARE

## 2024-11-21 DIAGNOSIS — I10 ESSENTIAL HYPERTENSION: ICD-10-CM

## 2024-11-21 DIAGNOSIS — R73.03 PRE-DIABETES: ICD-10-CM

## 2024-11-21 DIAGNOSIS — Z11.59 SCREENING FOR VIRAL DISEASE: ICD-10-CM

## 2024-11-21 DIAGNOSIS — E78.2 MIXED HYPERLIPIDEMIA: ICD-10-CM

## 2024-11-21 LAB
ALBUMIN SERPL BCP-MCNC: 4.3 G/DL (ref 3.4–5)
ALP SERPL-CCNC: 45 U/L (ref 33–136)
ALT SERPL W P-5'-P-CCNC: 17 U/L (ref 7–45)
ANION GAP SERPL CALC-SCNC: 12 MMOL/L (ref 10–20)
AST SERPL W P-5'-P-CCNC: 18 U/L (ref 9–39)
BASOPHILS # BLD AUTO: 0.05 X10*3/UL (ref 0–0.1)
BASOPHILS NFR BLD AUTO: 0.8 %
BILIRUB SERPL-MCNC: 0.6 MG/DL (ref 0–1.2)
BUN SERPL-MCNC: 18 MG/DL (ref 6–23)
CALCIUM SERPL-MCNC: 9.5 MG/DL (ref 8.6–10.6)
CHLORIDE SERPL-SCNC: 106 MMOL/L (ref 98–107)
CHOLEST SERPL-MCNC: 159 MG/DL (ref 0–199)
CHOLESTEROL/HDL RATIO: 2.6
CO2 SERPL-SCNC: 28 MMOL/L (ref 21–32)
CREAT SERPL-MCNC: 0.78 MG/DL (ref 0.5–1.05)
EGFRCR SERPLBLD CKD-EPI 2021: 79 ML/MIN/1.73M*2
EOSINOPHIL # BLD AUTO: 0.23 X10*3/UL (ref 0–0.4)
EOSINOPHIL NFR BLD AUTO: 3.6 %
ERYTHROCYTE [DISTWIDTH] IN BLOOD BY AUTOMATED COUNT: 12.5 % (ref 11.5–14.5)
EST. AVERAGE GLUCOSE BLD GHB EST-MCNC: 128 MG/DL
GLUCOSE SERPL-MCNC: 112 MG/DL (ref 74–99)
HBA1C MFR BLD: 6.1 %
HCT VFR BLD AUTO: 36.8 % (ref 36–46)
HCV AB SER QL: NONREACTIVE
HDLC SERPL-MCNC: 61.3 MG/DL
HGB BLD-MCNC: 12.6 G/DL (ref 12–16)
IMM GRANULOCYTES # BLD AUTO: 0.01 X10*3/UL (ref 0–0.5)
IMM GRANULOCYTES NFR BLD AUTO: 0.2 % (ref 0–0.9)
LDLC SERPL CALC-MCNC: 80 MG/DL
LYMPHOCYTES # BLD AUTO: 2.42 X10*3/UL (ref 0.8–3)
LYMPHOCYTES NFR BLD AUTO: 37.6 %
MCH RBC QN AUTO: 31.8 PG (ref 26–34)
MCHC RBC AUTO-ENTMCNC: 34.2 G/DL (ref 32–36)
MCV RBC AUTO: 93 FL (ref 80–100)
MONOCYTES # BLD AUTO: 0.6 X10*3/UL (ref 0.05–0.8)
MONOCYTES NFR BLD AUTO: 9.3 %
NEUTROPHILS # BLD AUTO: 3.12 X10*3/UL (ref 1.6–5.5)
NEUTROPHILS NFR BLD AUTO: 48.5 %
NON HDL CHOLESTEROL: 98 MG/DL (ref 0–149)
NRBC BLD-RTO: 0 /100 WBCS (ref 0–0)
PLATELET # BLD AUTO: 205 X10*3/UL (ref 150–450)
POTASSIUM SERPL-SCNC: 4.3 MMOL/L (ref 3.5–5.3)
PROT SERPL-MCNC: 6 G/DL (ref 6.4–8.2)
RBC # BLD AUTO: 3.96 X10*6/UL (ref 4–5.2)
SODIUM SERPL-SCNC: 142 MMOL/L (ref 136–145)
TRIGL SERPL-MCNC: 90 MG/DL (ref 0–149)
VLDL: 18 MG/DL (ref 0–40)
WBC # BLD AUTO: 6.4 X10*3/UL (ref 4.4–11.3)

## 2024-12-31 ENCOUNTER — OFFICE VISIT (OUTPATIENT)
Dept: URGENT CARE | Age: 74
End: 2024-12-31
Payer: MEDICARE

## 2024-12-31 VITALS
OXYGEN SATURATION: 96 % | DIASTOLIC BLOOD PRESSURE: 81 MMHG | SYSTOLIC BLOOD PRESSURE: 133 MMHG | RESPIRATION RATE: 16 BRPM | TEMPERATURE: 98.1 F | HEART RATE: 68 BPM

## 2024-12-31 DIAGNOSIS — J01.90 ACUTE SINUSITIS, RECURRENCE NOT SPECIFIED, UNSPECIFIED LOCATION: Primary | ICD-10-CM

## 2024-12-31 DIAGNOSIS — R05.1 ACUTE COUGH: ICD-10-CM

## 2024-12-31 PROCEDURE — 3075F SYST BP GE 130 - 139MM HG: CPT | Performed by: NURSE PRACTITIONER

## 2024-12-31 PROCEDURE — 1123F ACP DISCUSS/DSCN MKR DOCD: CPT | Performed by: NURSE PRACTITIONER

## 2024-12-31 PROCEDURE — 1159F MED LIST DOCD IN RCRD: CPT | Performed by: NURSE PRACTITIONER

## 2024-12-31 PROCEDURE — 3079F DIAST BP 80-89 MM HG: CPT | Performed by: NURSE PRACTITIONER

## 2024-12-31 PROCEDURE — 99203 OFFICE O/P NEW LOW 30 MIN: CPT | Performed by: NURSE PRACTITIONER

## 2024-12-31 RX ORDER — DOXYCYCLINE HYCLATE 100 MG
100 TABLET ORAL 2 TIMES DAILY
Qty: 20 TABLET | Refills: 0 | Status: SHIPPED | OUTPATIENT
Start: 2024-12-31 | End: 2025-01-10

## 2024-12-31 RX ORDER — ALBUTEROL SULFATE 90 UG/1
INHALANT RESPIRATORY (INHALATION)
Qty: 8.5 G | Refills: 0 | Status: SHIPPED | OUTPATIENT
Start: 2024-12-31 | End: 2024-12-31 | Stop reason: SDUPTHER

## 2024-12-31 RX ORDER — DOXYCYCLINE HYCLATE 100 MG
100 TABLET ORAL 2 TIMES DAILY
Qty: 20 TABLET | Refills: 0 | Status: SHIPPED | OUTPATIENT
Start: 2024-12-31 | End: 2024-12-31 | Stop reason: SDUPTHER

## 2024-12-31 RX ORDER — ALBUTEROL SULFATE 90 UG/1
INHALANT RESPIRATORY (INHALATION)
Qty: 8.5 G | Refills: 0 | Status: SHIPPED | OUTPATIENT
Start: 2024-12-31

## 2024-12-31 ASSESSMENT — ENCOUNTER SYMPTOMS
MYALGIAS: 0
SORE THROAT: 0
HEADACHES: 0
COUGH: 1
HEARTBURN: 0
RHINORRHEA: 1
WHEEZING: 0
HEMOPTYSIS: 0
WEIGHT LOSS: 0
SWEATS: 0
CHILLS: 0
FEVER: 0
SHORTNESS OF BREATH: 0

## 2024-12-31 NOTE — PROGRESS NOTES
Subjective   Patient ID: Velma Chapman is a 74 y.o. female. They present today with a chief complaint of Nasal Congestion and Cough (1+ months).    History of Present Illness    History provided by:  Patient   used: No    Cough  This is a new problem. Episode onset: 1 month. The cough is Productive of sputum. Associated symptoms include nasal congestion, postnasal drip and rhinorrhea. Pertinent negatives include no chest pain, chills, ear congestion, ear pain, fever, headaches, heartburn, hemoptysis, myalgias, rash, sore throat, shortness of breath, sweats, weight loss or wheezing. The symptoms are aggravated by lying down. Treatments tried: cough drops. The treatment provided no relief.       Past Medical History  Allergies as of 12/31/2024 - Reviewed 12/31/2024   Allergen Reaction Noted    Bee venom protein (honey bee) Unknown 03/15/2023    Penicillins Unknown 03/15/2023       (Not in a hospital admission)       Past Medical History:   Diagnosis Date    Hyperlipidemia, unspecified 12/13/2022    Hyperlipidemia    Hypothyroidism, unspecified 05/04/2022    Hypothyroidism    Medial meniscus tear 03/15/2023    Other conditions influencing health status 11/27/2020    History of cough    Personal history of other diseases of the circulatory system     History of hypertension    Personal history of other diseases of the musculoskeletal system and connective tissue     History of arthritis    Personal history of other diseases of the nervous system and sense organs     History of cataract    Status post right hip replacement 03/15/2023    Suspected COVID-19 virus infection 03/15/2023       Past Surgical History:   Procedure Laterality Date    FOOT SURGERY  10/21/2015    Foot Surgery    OTHER SURGICAL HISTORY  10/09/2013    Colonoscopy (Fiberoptic) Sigmoid Colon        reports that she quit smoking about 52 years ago. Her smoking use included cigarettes. She has been exposed to tobacco smoke. She has  never used smokeless tobacco. She reports that she does not currently use alcohol. She reports that she does not currently use drugs.    Review of Systems  Review of Systems   Constitutional:  Negative for chills, fever and weight loss.   HENT:  Positive for postnasal drip and rhinorrhea. Negative for ear pain and sore throat.    Respiratory:  Positive for cough. Negative for hemoptysis, shortness of breath and wheezing.    Cardiovascular:  Negative for chest pain.   Gastrointestinal:  Negative for heartburn.   Musculoskeletal:  Negative for myalgias.   Skin:  Negative for rash.   Neurological:  Negative for headaches.         Objective    Vitals:    12/31/24 1042   BP: 133/81   Pulse: 68   Resp: 16   Temp: 36.7 °C (98.1 °F)   SpO2: 96%     No LMP recorded. Patient is postmenopausal.    Physical Exam  Vitals and nursing note reviewed.   Constitutional:       Appearance: Normal appearance.   HENT:      Head: Normocephalic and atraumatic.      Right Ear: Hearing, tympanic membrane, ear canal and external ear normal.      Left Ear: Hearing, tympanic membrane, ear canal and external ear normal.      Nose: Mucosal edema and congestion present. No nasal deformity, septal deviation, signs of injury, laceration, nasal tenderness or rhinorrhea.      Right Sinus: No maxillary sinus tenderness or frontal sinus tenderness.      Left Sinus: No maxillary sinus tenderness or frontal sinus tenderness.      Mouth/Throat:      Lips: Pink.      Mouth: Mucous membranes are moist.      Pharynx: Uvula midline. Postnasal drip present.      Tonsils: No tonsillar exudate or tonsillar abscesses.   Cardiovascular:      Rate and Rhythm: Normal rate and regular rhythm.      Heart sounds: Normal heart sounds.   Pulmonary:      Effort: Pulmonary effort is normal.      Breath sounds: Normal breath sounds and air entry.   Musculoskeletal:      Cervical back: Normal range of motion and neck supple.   Lymphadenopathy:      Cervical: No cervical  adenopathy.   Neurological:      Mental Status: She is alert.   Psychiatric:         Mood and Affect: Mood normal.         Behavior: Behavior normal.         Procedures    Point of Care Test & Imaging Results from this visit  No results found for this visit on 12/31/24.   No results found.    Diagnostic study results (if any) were reviewed by ELIDIA Paul.    Assessment/Plan   Allergies, medications, history, and pertinent labs/EKGs/Imaging reviewed by ELIDIA Paul.     Medical Decision Making  Testing: None  Differential: 1) uri, 2) allergies , 3) pneumonia  Impression: sinusitis, presumptive pneumonia.  Treatment: Pt declined CXR.  Take the antibiotic with food.  Eat yogurt or take probiotic once a day.  Symptoms should improve in 2 to 3 days.   Wash your hands often, especially after coughing or touching your nose or mouth.  Use disposable tissues instead of handkerchiefs.  Increase fluid intake and rest as needed.  Take Tylenol and/or ibuprofen as needed for aches and pain and/or fever.  Plan: Return or follow-up with primary care provider if symptoms did not improve.  Plan: Call 911 or go to the nearest emergency room if symptoms became severe such as fever of 102.5 degrees Fahrenheit or 39.2 degrees Celsius, severe pain, shortness of breath, chest tightness.   Patient verbalized understanding of the instructions and left in stable condition.        Orders and Diagnoses  Diagnoses and all orders for this visit:  Acute sinusitis, recurrence not specified, unspecified location  -     doxycycline (Vibra-Tabs) 100 mg tablet; Take 1 tablet (100 mg) by mouth 2 times a day for 10 days. Take with a full glass of water and do not lie down for at least 30 minutes after.  Acute cough  -     albuterol (ProAir HFA) 90 mcg/actuation inhaler; Take 1-2 puffs every 4 to 6 hours as needed for cough and shortness of breath.      Medical Admin Record      Patient disposition: Home    Electronically signed by  ELIDIA Paul  11:00 AM

## 2025-03-04 ENCOUNTER — APPOINTMENT (OUTPATIENT)
Dept: PRIMARY CARE | Facility: CLINIC | Age: 75
End: 2025-03-04
Payer: MEDICARE

## 2025-03-04 VITALS
OXYGEN SATURATION: 97 % | SYSTOLIC BLOOD PRESSURE: 123 MMHG | BODY MASS INDEX: 27.12 KG/M2 | HEART RATE: 67 BPM | DIASTOLIC BLOOD PRESSURE: 72 MMHG | TEMPERATURE: 98 F | WEIGHT: 170.6 LBS

## 2025-03-04 DIAGNOSIS — E55.9 VITAMIN D DEFICIENCY: ICD-10-CM

## 2025-03-04 DIAGNOSIS — E78.2 MIXED HYPERLIPIDEMIA: ICD-10-CM

## 2025-03-04 DIAGNOSIS — I10 ESSENTIAL HYPERTENSION: ICD-10-CM

## 2025-03-04 DIAGNOSIS — E03.9 ACQUIRED HYPOTHYROIDISM: ICD-10-CM

## 2025-03-04 DIAGNOSIS — R73.03 PREDIABETES: ICD-10-CM

## 2025-03-04 DIAGNOSIS — Z91.030 BEE STING ALLERGY: ICD-10-CM

## 2025-03-04 DIAGNOSIS — R19.7 INTERMITTENT DIARRHEA: Primary | ICD-10-CM

## 2025-03-04 PROBLEM — Z00.00 MEDICARE ANNUAL WELLNESS VISIT, SUBSEQUENT: Status: RESOLVED | Noted: 2023-06-19 | Resolved: 2025-03-04

## 2025-03-04 PROBLEM — H91.90 HEARING LOSS: Status: RESOLVED | Noted: 2023-03-15 | Resolved: 2025-03-04

## 2025-03-04 PROCEDURE — 1123F ACP DISCUSS/DSCN MKR DOCD: CPT | Performed by: STUDENT IN AN ORGANIZED HEALTH CARE EDUCATION/TRAINING PROGRAM

## 2025-03-04 PROCEDURE — 3078F DIAST BP <80 MM HG: CPT | Performed by: STUDENT IN AN ORGANIZED HEALTH CARE EDUCATION/TRAINING PROGRAM

## 2025-03-04 PROCEDURE — G2211 COMPLEX E/M VISIT ADD ON: HCPCS | Performed by: STUDENT IN AN ORGANIZED HEALTH CARE EDUCATION/TRAINING PROGRAM

## 2025-03-04 PROCEDURE — 1159F MED LIST DOCD IN RCRD: CPT | Performed by: STUDENT IN AN ORGANIZED HEALTH CARE EDUCATION/TRAINING PROGRAM

## 2025-03-04 PROCEDURE — 3074F SYST BP LT 130 MM HG: CPT | Performed by: STUDENT IN AN ORGANIZED HEALTH CARE EDUCATION/TRAINING PROGRAM

## 2025-03-04 PROCEDURE — 1036F TOBACCO NON-USER: CPT | Performed by: STUDENT IN AN ORGANIZED HEALTH CARE EDUCATION/TRAINING PROGRAM

## 2025-03-04 PROCEDURE — 99214 OFFICE O/P EST MOD 30 MIN: CPT | Performed by: STUDENT IN AN ORGANIZED HEALTH CARE EDUCATION/TRAINING PROGRAM

## 2025-03-04 RX ORDER — ATORVASTATIN CALCIUM 40 MG/1
40 TABLET, FILM COATED ORAL DAILY
Qty: 90 TABLET | Refills: 3 | Status: SHIPPED | OUTPATIENT
Start: 2025-03-04

## 2025-03-04 RX ORDER — EPINEPHRINE 0.3 MG/.3ML
1 INJECTION SUBCUTANEOUS AS NEEDED
Qty: 2 ML | Refills: 3 | Status: SHIPPED | OUTPATIENT
Start: 2025-03-04

## 2025-03-04 RX ORDER — SPIRONOLACTONE 25 MG/1
75 TABLET ORAL DAILY
Qty: 90 TABLET | Refills: 3 | Status: SHIPPED | OUTPATIENT
Start: 2025-03-04

## 2025-03-04 RX ORDER — ACETAMINOPHEN 500 MG
TABLET ORAL DAILY
COMMUNITY

## 2025-03-04 RX ORDER — KETOCONAZOLE 20 MG/ML
SHAMPOO, SUSPENSION TOPICAL
COMMUNITY
Start: 2024-12-17

## 2025-03-04 RX ORDER — METOPROLOL SUCCINATE 50 MG/1
50 TABLET, EXTENDED RELEASE ORAL DAILY
Qty: 90 TABLET | Refills: 3 | Status: SHIPPED | OUTPATIENT
Start: 2025-03-04

## 2025-03-04 RX ORDER — BIMATOPROST 3 UG/ML
SOLUTION TOPICAL
COMMUNITY

## 2025-03-04 RX ORDER — CLINDAMYCIN AND BENZOYL PEROXIDE 10; 50 MG/G; MG/G
GEL TOPICAL
COMMUNITY

## 2025-03-04 ASSESSMENT — ENCOUNTER SYMPTOMS
FEVER: 0
CHILLS: 0
VOMITING: 0
NAUSEA: 0
DIARRHEA: 1
SHORTNESS OF BREATH: 0
ABDOMINAL PAIN: 0
CONSTIPATION: 0

## 2025-03-04 NOTE — ASSESSMENT & PLAN NOTE
Orders:    CBC; Future    Comprehensive Metabolic Panel; Future    Hemoglobin A1C; Future  chronic, stable

## 2025-03-04 NOTE — ASSESSMENT & PLAN NOTE
Orders:    spironolactone (Aldactone) 25 mg tablet; Take 3 tablets (75 mg) by mouth once daily.    metoprolol succinate XL (Toprol-XL) 50 mg 24 hr tablet; Take 1 tablet (50 mg) by mouth once daily. Do not crush or chew.  chronic, stable

## 2025-03-04 NOTE — PROGRESS NOTES
Assessment/Plan   Assessment & Plan  Intermittent diarrhea    Orders:    psyllium (Metamucil) 3.4 gram packet; Take 1 packet by mouth once daily. Mix and drink with at least 8 ounces of water or juice.    Referral to Gastroenterology; Future    Bee sting allergy    Orders:    EPINEPHrine 0.3 mg/0.3 mL injection syringe; Inject 0.3 mL (0.3 mg) into the muscle if needed for anaphylaxis. Inject into upper leg. Call 911 after use.    Prediabetes    Orders:    CBC; Future    Comprehensive Metabolic Panel; Future    Hemoglobin A1C; Future  chronic, stable  Vitamin D deficiency    Orders:    Vitamin D 25-Hydroxy,Total (for eval of Vitamin D levels); Future    Acquired hypothyroidism    Orders:    Tsh With Reflex To Free T4 If Abnormal; Future    Essential hypertension    Orders:    spironolactone (Aldactone) 25 mg tablet; Take 3 tablets (75 mg) by mouth once daily.    metoprolol succinate XL (Toprol-XL) 50 mg 24 hr tablet; Take 1 tablet (50 mg) by mouth once daily. Do not crush or chew.  chronic, stable  Mixed hyperlipidemia    Orders:    atorvastatin (Lipitor) 40 mg tablet; Take 1 tablet (40 mg) by mouth once daily.  chronic, stable      Subjective   Patient ID: Velma Chapman is a 74 y.o. female who presents for Transfer Of Care and Establish Care (Has some questions, med refill, lab work).  HPI    Patient is here to establish care today. She needs refills of lipitor for HLD, aldactone and metoprol sucicnate for HTN. BP well controlled this office visit.        Additionally, she has intermittent episodes of diarrhea where she will have over 4 Bms at a time. Denies associated abdominal pain, steatorrhea with these episodes.  Review of Systems   Constitutional:  Negative for chills and fever.   Respiratory:  Negative for shortness of breath.    Cardiovascular:  Negative for chest pain.   Gastrointestinal:  Positive for diarrhea. Negative for abdominal pain, constipation, nausea and vomiting.       Objective   Physical  Exam  Constitutional:       General: She is not in acute distress.     Appearance: Normal appearance. She is not ill-appearing.   HENT:      Head: Normocephalic and atraumatic.   Eyes:      Extraocular Movements: Extraocular movements intact.   Cardiovascular:      Rate and Rhythm: Normal rate and regular rhythm.   Neurological:      Mental Status: She is alert.              Ze Tracey MD 03/04/25 1:33 PM

## 2025-03-04 NOTE — ASSESSMENT & PLAN NOTE
Orders:    atorvastatin (Lipitor) 40 mg tablet; Take 1 tablet (40 mg) by mouth once daily.  chronic, stable

## 2025-03-04 NOTE — ASSESSMENT & PLAN NOTE
Orders:    psyllium (Metamucil) 3.4 gram packet; Take 1 packet by mouth once daily. Mix and drink with at least 8 ounces of water or juice.    Referral to Gastroenterology; Future

## 2025-03-04 NOTE — ASSESSMENT & PLAN NOTE
Orders:    EPINEPHrine 0.3 mg/0.3 mL injection syringe; Inject 0.3 mL (0.3 mg) into the muscle if needed for anaphylaxis. Inject into upper leg. Call 911 after use.

## 2025-03-07 ENCOUNTER — TELEPHONE (OUTPATIENT)
Dept: PRIMARY CARE | Facility: CLINIC | Age: 75
End: 2025-03-07
Payer: MEDICARE

## 2025-03-07 LAB
25(OH)D3+25(OH)D2 SERPL-MCNC: 63 NG/ML (ref 30–100)
ALBUMIN SERPL-MCNC: 4.5 G/DL (ref 3.6–5.1)
ALP SERPL-CCNC: 47 U/L (ref 37–153)
ALT SERPL-CCNC: 16 U/L (ref 6–29)
ANION GAP SERPL CALCULATED.4IONS-SCNC: 6 MMOL/L (CALC) (ref 7–17)
AST SERPL-CCNC: 19 U/L (ref 10–35)
BILIRUB SERPL-MCNC: 0.8 MG/DL (ref 0.2–1.2)
BUN SERPL-MCNC: 22 MG/DL (ref 7–25)
CALCIUM SERPL-MCNC: 10 MG/DL (ref 8.6–10.4)
CHLORIDE SERPL-SCNC: 106 MMOL/L (ref 98–110)
CO2 SERPL-SCNC: 27 MMOL/L (ref 20–32)
CREAT SERPL-MCNC: 0.7 MG/DL (ref 0.6–1)
EGFRCR SERPLBLD CKD-EPI 2021: 90 ML/MIN/1.73M2
ERYTHROCYTE [DISTWIDTH] IN BLOOD BY AUTOMATED COUNT: 11.7 % (ref 11–15)
EST. AVERAGE GLUCOSE BLD GHB EST-MCNC: 134 MG/DL
EST. AVERAGE GLUCOSE BLD GHB EST-SCNC: 7.4 MMOL/L
GLUCOSE SERPL-MCNC: 83 MG/DL (ref 65–139)
HBA1C MFR BLD: 6.3 % OF TOTAL HGB
HCT VFR BLD AUTO: 38.3 % (ref 35–45)
HGB BLD-MCNC: 12.9 G/DL (ref 11.7–15.5)
MCH RBC QN AUTO: 31 PG (ref 27–33)
MCHC RBC AUTO-ENTMCNC: 33.7 G/DL (ref 32–36)
MCV RBC AUTO: 92.1 FL (ref 80–100)
PLATELET # BLD AUTO: 185 THOUSAND/UL (ref 140–400)
PMV BLD REES-ECKER: 10.8 FL (ref 7.5–12.5)
POTASSIUM SERPL-SCNC: 4.4 MMOL/L (ref 3.5–5.3)
PROT SERPL-MCNC: 6.8 G/DL (ref 6.1–8.1)
RBC # BLD AUTO: 4.16 MILLION/UL (ref 3.8–5.1)
SODIUM SERPL-SCNC: 139 MMOL/L (ref 135–146)
TSH SERPL-ACNC: 0.73 MIU/L (ref 0.4–4.5)
WBC # BLD AUTO: 7 THOUSAND/UL (ref 3.8–10.8)

## 2025-03-07 NOTE — TELEPHONE ENCOUNTER
Per HIPAA ok to leave detailed message, LM on pt voicemail with results. She should call back if she has any questions or concerns.

## 2025-03-07 NOTE — RESULT ENCOUNTER NOTE
A1C in prediabetic range, goal for her age is A1C 7% or lower. Continue to monitor. No other concerning findings on labs

## 2025-03-07 NOTE — TELEPHONE ENCOUNTER
----- Message from Ze Tracey sent at 3/7/2025  8:14 AM EST -----  A1C in prediabetic range, goal for her age is A1C 7% or lower. Continue to monitor. No other concerning findings on labs

## 2025-06-02 DIAGNOSIS — E03.9 ACQUIRED HYPOTHYROIDISM: ICD-10-CM

## 2025-06-02 NOTE — TELEPHONE ENCOUNTER
Patient called and requested levothyroxine be sent to [facility].     Pended. But needs reviewed and signed.

## 2025-06-04 ENCOUNTER — TELEPHONE (OUTPATIENT)
Dept: PRIMARY CARE | Facility: CLINIC | Age: 75
End: 2025-06-04
Payer: MEDICARE

## 2025-06-04 RX ORDER — LEVOTHYROXINE SODIUM 88 UG/1
88 TABLET ORAL
Qty: 72 TABLET | Refills: 1 | Status: SHIPPED | OUTPATIENT
Start: 2025-06-04

## 2025-06-04 NOTE — TELEPHONE ENCOUNTER
In response to [employer] message, called and spoke to patient let her know levothyroxine was sent to [facility] and should be getting a notification from them when it is available.    No further questions or concerns.

## 2025-09-02 ENCOUNTER — APPOINTMENT (OUTPATIENT)
Dept: URGENT CARE | Age: 75
End: 2025-09-02
Payer: MEDICARE

## 2025-09-02 ENCOUNTER — OFFICE VISIT (OUTPATIENT)
Dept: URGENT CARE | Age: 75
End: 2025-09-02
Payer: MEDICARE

## 2025-09-02 DIAGNOSIS — R05.1 ACUTE COUGH: Primary | ICD-10-CM

## 2025-09-02 LAB
POC CORONAVIRUS SARS-COV-2 PCR: NEGATIVE
POC HUMAN RHINOVIRUS PCR: NEGATIVE
POC INFLUENZA A VIRUS PCR: NEGATIVE
POC INFLUENZA B VIRUS PCR: NEGATIVE
POC RESPIRATORY SYNCYTIAL VIRUS PCR: NEGATIVE

## 2025-09-02 RX ORDER — AZITHROMYCIN 250 MG/1
TABLET, FILM COATED ORAL
Qty: 6 TABLET | Refills: 0 | Status: SHIPPED | OUTPATIENT
Start: 2025-09-02

## 2025-09-02 RX ORDER — BENZONATATE 200 MG/1
200 CAPSULE ORAL 3 TIMES DAILY PRN
Qty: 30 CAPSULE | Refills: 0 | Status: SHIPPED | OUTPATIENT
Start: 2025-09-02 | End: 2025-09-09

## 2025-09-02 ASSESSMENT — ENCOUNTER SYMPTOMS
FEVER: 0
COUGH: 1

## 2025-09-05 ENCOUNTER — TELEPHONE (OUTPATIENT)
Dept: PRIMARY CARE | Facility: CLINIC | Age: 75
End: 2025-09-05
Payer: MEDICARE

## 2025-09-10 ENCOUNTER — APPOINTMENT (OUTPATIENT)
Dept: PRIMARY CARE | Facility: CLINIC | Age: 75
End: 2025-09-10
Payer: MEDICARE

## 2025-11-19 ENCOUNTER — APPOINTMENT (OUTPATIENT)
Dept: GASTROENTEROLOGY | Facility: CLINIC | Age: 75
End: 2025-11-19
Payer: MEDICARE